# Patient Record
Sex: MALE | Race: WHITE | NOT HISPANIC OR LATINO | Employment: OTHER | URBAN - NONMETROPOLITAN AREA
[De-identification: names, ages, dates, MRNs, and addresses within clinical notes are randomized per-mention and may not be internally consistent; named-entity substitution may affect disease eponyms.]

---

## 2018-01-11 ENCOUNTER — TRANSCRIBE ORDERS (OUTPATIENT)
Dept: ADMINISTRATIVE | Facility: HOSPITAL | Age: 83
End: 2018-01-11

## 2018-01-11 ENCOUNTER — HOSPITAL ENCOUNTER (OUTPATIENT)
Dept: GENERAL RADIOLOGY | Facility: HOSPITAL | Age: 83
Discharge: HOME | End: 2018-01-11
Attending: INTERNAL MEDICINE | Admitting: INTERNAL MEDICINE

## 2018-01-11 DIAGNOSIS — B96.89 ACUTE BRONCHITIS, BACTERIAL: Primary | ICD-10-CM

## 2018-01-11 DIAGNOSIS — J20.8 ACUTE BRONCHITIS, BACTERIAL: Primary | ICD-10-CM

## 2018-01-11 PROCEDURE — 71046 X-RAY EXAM CHEST 2 VIEWS: CPT

## 2018-11-21 RX ORDER — ASPIRIN 325 MG
325 TABLET ORAL DAILY
COMMUNITY

## 2018-11-21 RX ORDER — METOPROLOL SUCCINATE 50 MG/1
50 TABLET, EXTENDED RELEASE ORAL 2 TIMES DAILY
COMMUNITY
End: 2021-01-01

## 2018-11-21 RX ORDER — AMLODIPINE BESYLATE 5 MG/1
5 TABLET ORAL DAILY
COMMUNITY
End: 2021-01-01

## 2018-11-21 RX ORDER — FUROSEMIDE 20 MG/1
20 TABLET ORAL DAILY
COMMUNITY
End: 2020-01-01 | Stop reason: SDUPTHER

## 2018-11-21 RX ORDER — CLONIDINE HYDROCHLORIDE 0.1 MG/1
0.1 TABLET ORAL 3 TIMES DAILY PRN
COMMUNITY
End: 2021-01-01

## 2018-11-21 RX ORDER — MELOXICAM 15 MG/1
15 TABLET ORAL DAILY
COMMUNITY
End: 2019-01-17 | Stop reason: HOSPADM

## 2018-11-21 RX ORDER — MECLIZINE HYDROCHLORIDE 25 MG/1
25 TABLET ORAL 2 TIMES DAILY PRN
COMMUNITY

## 2018-11-21 RX ORDER — SILDENAFIL 100 MG/1
50 TABLET, FILM COATED ORAL DAILY PRN
COMMUNITY

## 2018-11-28 ENCOUNTER — OFFICE VISIT (OUTPATIENT)
Dept: GASTROENTEROLOGY | Facility: CLINIC | Age: 83
End: 2018-11-28

## 2018-11-28 VITALS
WEIGHT: 179.6 LBS | OXYGEN SATURATION: 97 % | SYSTOLIC BLOOD PRESSURE: 130 MMHG | HEART RATE: 87 BPM | DIASTOLIC BLOOD PRESSURE: 82 MMHG | BODY MASS INDEX: 24.33 KG/M2 | HEIGHT: 72 IN

## 2018-11-28 DIAGNOSIS — R13.19 ESOPHAGEAL DYSPHAGIA: Primary | ICD-10-CM

## 2018-11-28 DIAGNOSIS — I10 HTN (HYPERTENSION), BENIGN: ICD-10-CM

## 2018-11-28 PROCEDURE — 99204 OFFICE O/P NEW MOD 45 MIN: CPT | Performed by: CLINICAL NURSE SPECIALIST

## 2018-12-13 ENCOUNTER — ANESTHESIA (OUTPATIENT)
Dept: GASTROENTEROLOGY | Facility: HOSPITAL | Age: 83
End: 2018-12-13

## 2018-12-13 ENCOUNTER — ANESTHESIA EVENT (OUTPATIENT)
Dept: GASTROENTEROLOGY | Facility: HOSPITAL | Age: 83
End: 2018-12-13

## 2018-12-13 ENCOUNTER — HOSPITAL ENCOUNTER (OUTPATIENT)
Facility: HOSPITAL | Age: 83
Setting detail: HOSPITAL OUTPATIENT SURGERY
Discharge: HOME | End: 2018-12-13
Attending: INTERNAL MEDICINE | Admitting: INTERNAL MEDICINE

## 2018-12-13 VITALS
OXYGEN SATURATION: 97 % | TEMPERATURE: 98.1 F | BODY MASS INDEX: 25.76 KG/M2 | WEIGHT: 184 LBS | RESPIRATION RATE: 25 BRPM | HEIGHT: 71 IN | DIASTOLIC BLOOD PRESSURE: 91 MMHG | SYSTOLIC BLOOD PRESSURE: 152 MMHG | HEART RATE: 73 BPM

## 2018-12-13 DIAGNOSIS — R13.19 ESOPHAGEAL DYSPHAGIA: ICD-10-CM

## 2018-12-13 PROCEDURE — 43239 EGD BIOPSY SINGLE/MULTIPLE: CPT | Performed by: INTERNAL MEDICINE

## 2018-12-13 PROCEDURE — 88305 TISSUE EXAM BY PATHOLOGIST: CPT | Performed by: INTERNAL MEDICINE

## 2018-12-13 PROCEDURE — 25010000002 PROPOFOL 10 MG/ML EMULSION: Performed by: NURSE ANESTHETIST, CERTIFIED REGISTERED

## 2018-12-13 PROCEDURE — 87081 CULTURE SCREEN ONLY: CPT | Performed by: INTERNAL MEDICINE

## 2018-12-13 RX ORDER — PROPOFOL 10 MG/ML
VIAL (ML) INTRAVENOUS AS NEEDED
Status: DISCONTINUED | OUTPATIENT
Start: 2018-12-13 | End: 2018-12-13 | Stop reason: SURG

## 2018-12-13 RX ORDER — SODIUM CHLORIDE 9 MG/ML
500 INJECTION, SOLUTION INTRAVENOUS CONTINUOUS PRN
Status: DISCONTINUED | OUTPATIENT
Start: 2018-12-13 | End: 2018-12-13 | Stop reason: HOSPADM

## 2018-12-13 RX ORDER — LIDOCAINE HYDROCHLORIDE 20 MG/ML
INJECTION, SOLUTION INFILTRATION; PERINEURAL AS NEEDED
Status: DISCONTINUED | OUTPATIENT
Start: 2018-12-13 | End: 2018-12-13 | Stop reason: SURG

## 2018-12-13 RX ORDER — PANTOPRAZOLE SODIUM 40 MG/1
40 TABLET, DELAYED RELEASE ORAL 2 TIMES DAILY
Qty: 180 TABLET | Refills: 3 | Status: SHIPPED | OUTPATIENT
Start: 2018-12-13

## 2018-12-13 RX ORDER — SODIUM CHLORIDE 0.9 % (FLUSH) 0.9 %
3 SYRINGE (ML) INJECTION AS NEEDED
Status: DISCONTINUED | OUTPATIENT
Start: 2018-12-13 | End: 2018-12-13 | Stop reason: HOSPADM

## 2018-12-13 RX ADMIN — LIDOCAINE HYDROCHLORIDE 0.5 ML: 10 INJECTION, SOLUTION EPIDURAL; INFILTRATION; INTRACAUDAL; PERINEURAL at 11:59

## 2018-12-13 RX ADMIN — SODIUM CHLORIDE 500 ML: 9 INJECTION, SOLUTION INTRAVENOUS at 11:59

## 2018-12-13 RX ADMIN — LIDOCAINE HYDROCHLORIDE 100 MG: 20 INJECTION, SOLUTION INFILTRATION; PERINEURAL at 13:51

## 2018-12-13 RX ADMIN — PROPOFOL 100 MG: 10 INJECTION, EMULSION INTRAVENOUS at 13:49

## 2018-12-13 RX ADMIN — LIDOCAINE HYDROCHLORIDE 100 MG: 20 INJECTION, SOLUTION INFILTRATION; PERINEURAL at 13:49

## 2018-12-13 NOTE — ANESTHESIA POSTPROCEDURE EVALUATION
Patient: Broderick Gomez    Procedure Summary     Date:  12/13/18 Room / Location:  [facility] ENDOSCOPY 4 / [facility] ENDOSCOPY    Anesthesia Start:  1345 Anesthesia Stop:  1358    Procedure:  ESOPHAGOGASTRODUODENOSCOPY WITH ANESTHESIA (N/A ) Diagnosis:       Esophageal dysphagia      (Esophageal dysphagia [R13.10])    Surgeon:  Fernando Del Castillo MD Provider:  Perez Leiva CRNA    Anesthesia Type:  general ASA Status:  3          Anesthesia Type: general  Last vitals  BP   131/100 (12/13/18 1145)   Temp   98.1 °F (36.7 °C) (12/13/18 1145)   Pulse   88 (12/13/18 1145)   Resp   20 (12/13/18 1145)     SpO2   94 % (12/13/18 1145)     Post Anesthesia Care and Evaluation    Patient location during evaluation: PACU  Patient participation: complete - patient participated  Level of consciousness: awake and alert  Pain management: adequate  Airway patency: patent  Anesthetic complications: No anesthetic complications    Cardiovascular status: acceptable  Respiratory status: acceptable  Hydration status: acceptable

## 2018-12-13 NOTE — ANESTHESIA PREPROCEDURE EVALUATION
Anesthesia Evaluation     Patient summary reviewed   no history of anesthetic complications:  NPO Solid Status: > 8 hours  NPO Liquid Status: > 8 hours           Airway   Mallampati: II  Dental    (+) lower dentures and upper dentures    Pulmonary - negative pulmonary ROS   (-) asthma, sleep apnea  Cardiovascular   Exercise tolerance: (Unsteady gait limited function, denies chest pain)    Patient on routine beta blocker and Beta blocker given within 24 hours of surgery    (+) pacemaker ([employer]) pacemaker, hypertension, dysrhythmias (sick sinus syndrome), hyperlipidemia,       Neuro/Psych  (+) TIA,     (-) seizures  GI/Hepatic/Renal/Endo    (-) liver disease, no renal disease, diabetes    Musculoskeletal     Abdominal    Substance History      OB/GYN          Other                        Anesthesia Plan    ASA 3     general   total IV anesthesia  intravenous induction   Anesthetic plan, all risks, benefits, and alternatives have been provided, discussed and informed consent has been obtained with: patient.

## 2018-12-14 LAB — UREASE TISS QL: NEGATIVE

## 2018-12-22 LAB
CYTO UR: NORMAL
LAB AP CASE REPORT: NORMAL
LAB AP CLINICAL INFORMATION: NORMAL
PATH REPORT.FINAL DX SPEC: NORMAL
PATH REPORT.GROSS SPEC: NORMAL

## 2019-01-17 ENCOUNTER — HOSPITAL ENCOUNTER (EMERGENCY)
Facility: HOSPITAL | Age: 84
Discharge: HOME | End: 2019-01-17
Attending: EMERGENCY MEDICINE | Admitting: EMERGENCY MEDICINE

## 2019-01-17 VITALS
OXYGEN SATURATION: 93 % | HEIGHT: 72 IN | RESPIRATION RATE: 18 BRPM | TEMPERATURE: 98.6 F | WEIGHT: 181 LBS | DIASTOLIC BLOOD PRESSURE: 70 MMHG | SYSTOLIC BLOOD PRESSURE: 110 MMHG | BODY MASS INDEX: 24.52 KG/M2 | HEART RATE: 75 BPM

## 2019-01-17 DIAGNOSIS — S01.01XA LACERATION OF SCALP, INITIAL ENCOUNTER: Primary | ICD-10-CM

## 2019-01-17 PROCEDURE — 99283 EMERGENCY DEPT VISIT LOW MDM: CPT

## 2019-01-17 RX ORDER — NIFEDIPINE 10 MG/1
10 CAPSULE ORAL ONCE
Status: COMPLETED | OUTPATIENT
Start: 2019-01-17 | End: 2019-01-17

## 2019-01-17 RX ORDER — LIDOCAINE HYDROCHLORIDE 10 MG/ML
10 INJECTION, SOLUTION EPIDURAL; INFILTRATION; INTRACAUDAL; PERINEURAL ONCE
Status: COMPLETED | OUTPATIENT
Start: 2019-01-17 | End: 2019-01-17

## 2019-01-17 RX ADMIN — LIDOCAINE HYDROCHLORIDE 10 ML: 10 INJECTION, SOLUTION EPIDURAL; INFILTRATION; INTRACAUDAL; PERINEURAL at 22:24

## 2019-01-17 RX ADMIN — NIFEDIPINE 10 MG: 10 CAPSULE ORAL at 22:27

## 2019-01-18 NOTE — DISCHARGE INSTRUCTIONS
Head Injury, Adult  There are many types of head injuries. They can be as minor as a bump. Some head injuries can be worse. Worse injuries include:  · A strong hit to the head that hurts the brain (concussion).  · A bruise of the brain (contusion). This means there is bleeding in the brain that can cause swelling.  · A cracked skull (skull fracture).  · Bleeding in the brain that gathers, gets thick (makes a clot), and forms a bump (hematoma).    Most problems from a head injury come in the first 24 hours. However, you may still have side effects up to 7-10 days after your injury. It is important to watch your condition for any changes.  Follow these instructions at home:  Activity  · Rest as much as possible.  · Avoid activities that are hard or tiring.  · Make sure you get enough sleep.  · Limit activities that need a lot of thought or attention, such as:  ? Watching TV.  ? Playing memory games and puzzles.  ? Job-related work or homework.  ? Working on the computer, social media, and texting.  · Avoid activities that could cause another head injury until your doctor says it is okay. This includes playing sports.  · Ask your doctor when it is safe for you to go back to your normal activities, such as work or school. Ask your doctor for a step-by-step plan for slowly going back to your normal activities.  · Ask your doctor when you can drive, ride a bicycle, or use heavy machinery. Never do these activities if you are dizzy.  Lifestyle  · Do not drink alcohol until your doctor says it is okay.  · Avoid drug use.  · If it is harder than usual to remember things, write them down.  · If you are easily distracted, try to do one thing at a time.  · Talk with family members or close friends when making important decisions.  · Tell your friends, family, a trusted coworker, and  about your injury, symptoms, and limits (restrictions). Have them watch for any problems that are new or getting worse.  General  instructions  · Take over-the-counter and prescription medicines only as told by your doctor.  · Have someone stay with you for 24 hours after your head injury. This person should watch you for any changes in your symptoms and be ready to get help.  · Keep all follow-up visits as told by your doctor. This is important.  How is this prevented?  · Work on your balance and strength. This can help you avoid falls.  · Wear a seatbelt when you are in a moving vehicle.  · Wear a helmet when:  ? Riding a bicycle.  ? Skiing.  ? Doing any other sport or activity that has a risk of injury.  · Drink alcohol only in moderation.  · Make your home safer by:  ? Getting rid of clutter from the floors and stairs, like things that can make you trip.  ? Using grab bars in bathrooms and handrails by stairs.  ? Placing non-slip mats on floors and in bathtubs.  ? Putting more light in dim areas.  Get help right away if:  · You have:  ? A very bad (severe) headache that is not helped by medicine.  ? Trouble walking or weakness in your arms and legs.  ? Clear or bloody fluid coming from your nose or ears.  ? Changes in your seeing (vision).  ? Jerky movements that you cannot control (seizure).  · You throw up (vomit).  · Your symptoms get worse.  · You lose balance.  · Your speech is slurred.  · You pass out.  · You are sleepier and have trouble staying awake.  · The black centers of your eyes (pupils) change in size.  These symptoms may be an emergency. Do not wait to see if the symptoms will go away. Get medical help right away. Call your local emergency services (911 in the [place] Do not drive yourself to the hospital.  This information is not intended to replace advice given to you by your health care provider. Make sure you discuss any questions you have with your health care provider.  Document Released: 11/30/2009 Document Revised: 04/13/2018 Document Reviewed: 06/27/2017  [employer] Interactive Patient Education © 2018 [employer]      Laceration Care, Adult  A laceration is a cut that goes through all of the layers of the skin and into the tissue that is right under the skin. Some lacerations heal on their own. Others need to be closed with stitches (sutures), staples, skin adhesive strips, or skin glue. Proper laceration care minimizes the risk of infection and helps the laceration to heal better.  How is this treated?  If sutures or staples were used:  · Keep the wound clean and dry.  · If you were given a bandage (dressing), you should change it at least one time per day or as told by your health care provider. You should also change it if it becomes wet or dirty.  · Keep the wound completely dry for the first 24 hours or as told by your health care provider. After that time, you may shower or bathe. However, make sure that the wound is not soaked in water until after the sutures or staples have been removed.  · Clean the wound one time each day or as told by your health care provider:  ? Wash the wound with soap and water.  ? Rinse the wound with water to remove all soap.  ? Pat the wound dry with a clean towel. Do not rub the wound.  · After cleaning the wound, apply a thin layer of antibiotic ointment as told by your health care provider. This will help to prevent infection and keep the dressing from sticking to the wound.  · Have the sutures or staples removed as told by your health care provider.  If skin adhesive strips were used:  · Keep the wound clean and dry.  · If you were given a bandage (dressing), you should change it at least one time per day or as told by your health care provider. You should also change it if it becomes dirty or wet.  · Do not get the skin adhesive strips wet. You may shower or bathe, but be careful to keep the wound dry.  · If the wound gets wet, pat it dry with a clean towel. Do not rub the wound.  · Skin adhesive strips fall off on their own. You may trim the strips as the wound heals. Do not  remove skin adhesive strips that are still stuck to the wound. They will fall off in time.  If skin glue was used:  · Try to keep the wound dry, but you may briefly wet it in the shower or bath. Do not soak the wound in water, such as by swimming.  · After you have showered or bathed, gently pat the wound dry with a clean towel. Do not rub the wound.  · Do not do any activities that will make you sweat heavily until the skin glue has fallen off on its own.  · Do not apply liquid, cream, or ointment medicine to the wound while the skin glue is in place. Using those may loosen the film before the wound has healed.  · If you were given a bandage (dressing), you should change it at least one time per day or as told by your health care provider. You should also change it if it becomes dirty or wet.  · If a dressing is placed over the wound, be careful not to apply tape directly over the skin glue. Doing that may cause the glue to be pulled off before the wound has healed.  · Do not pick at the glue. The skin glue usually remains in place for 5-10 days, then it falls off of the skin.  General Instructions  · Take over-the-counter and prescription medicines only as told by your health care provider.  · If you were prescribed an antibiotic medicine or ointment, take or apply it as told by your doctor. Do not stop using it even if your condition improves.  · To help prevent scarring, make sure to cover your wound with sunscreen whenever you are outside after stitches are removed, after adhesive strips are removed, or when glue remains in place and the wound is healed. Make sure to wear a sunscreen of at least 30 SPF.  · Do not scratch or pick at the wound.  · Keep all follow-up visits as told by your health care provider. This is important.  · Check your wound every day for signs of infection. Watch for:  ? Redness, swelling, or pain.  ? Fluid, blood, or pus.  · Raise (elevate) the injured area above the level of your heart  while you are sitting or lying down, if possible.  Contact a health care provider if:  · You received a tetanus shot and you have swelling, severe pain, redness, or bleeding at the injection site.  · You have a fever.  · A wound that was closed breaks open.  · You notice a bad smell coming from your wound or your dressing.  · You notice something coming out of the wound, such as wood or glass.  · Your pain is not controlled with medicine.  · You have increased redness, swelling, or pain at the site of your wound.  · You have fluid, blood, or pus coming from your wound.  · You notice a change in the color of your skin near your wound.  · You need to change the dressing frequently due to fluid, blood, or pus draining from the wound.  · You develop a new rash.  · You develop numbness around the wound.  Get help right away if:  · You develop severe swelling around the wound.  · Your pain suddenly increases and is severe.  · You develop painful lumps near the wound or on skin that is anywhere on your body.  · You have a red streak going away from your wound.  · The wound is on your hand or foot and you cannot properly move a finger or toe.  · The wound is on your hand or foot and you notice that your fingers or toes look pale or bluish.  This information is not intended to replace advice given to you by your health care provider. Make sure you discuss any questions you have with your health care provider.  Document Released: 12/18/2006 Document Revised: 05/19/2017 Document Reviewed: 12/14/2015  [employer] Interactive Patient Education © 2018 [employer]

## 2019-01-18 NOTE — ED PROVIDER NOTES
Subjective     Fall   Mechanism of injury: fall    Injury location:  Head/neck  Head/neck injury location:  Scalp  Incident location:  Home  Arrived directly from scene: yes    Fall:     Fall occurred:  Standing    Impact surface:  Furniture    Point of impact:  Head    Entrapped after fall: no    Suspicion of alcohol use: no    Suspicion of drug use: no    Tetanus status:  Unknown  Prior to arrival data:     Loss of consciousness: no      Amnesic to event: no      Airway condition since incident:  Stable    Breathing condition since incident:  Stable    Circulation condition since incident:  Stable    Mental status condition since incident:  Stable    Disability condition since incident:  Stable  Associated symptoms: no abdominal pain, no back pain, no blindness, no chest pain, no headaches, no hearing loss, no loss of consciousness, no nausea, no neck pain, no seizures and no vomiting    Risk factors: no anticoagulation therapy, no dialysis and no hemophilia    Head Laceration   Associated symptoms: no abdominal pain, no chest pain, no congestion, no cough, no fatigue, no fever, no headaches, no loss of consciousness, no nausea and no vomiting        Review of Systems   Constitutional: Negative.  Negative for chills, fatigue and fever.   HENT: Negative.  Negative for congestion and hearing loss.    Eyes: Negative for blindness.   Respiratory: Negative.  Negative for cough, chest tightness and stridor.    Cardiovascular: Negative.  Negative for chest pain.   Gastrointestinal: Negative.  Negative for abdominal distention, abdominal pain, nausea and vomiting.   Endocrine: Negative.    Genitourinary: Negative.  Negative for difficulty urinating and flank pain.   Musculoskeletal: Negative.  Negative for back pain and neck pain.   Skin: Negative.  Negative for color change.   Neurological: Negative.  Negative for dizziness, seizures, loss of consciousness and headaches.   All other systems reviewed and are  negative.      Past Medical History:   Diagnosis Date   • Acute right lumbar radiculopathy    • Arthritis    • Ataxia    • Atrial fibrillation (CMS/HCC)    • Bilateral edema of lower extremity    • BPPV (benign paroxysmal positional vertigo), bilateral    • Carotid occlusion, bilateral    • Cerebral microvascular disease    • CVA (cerebral vascular accident) (CMS/HCC)    • Elevated PSA    • GERD (gastroesophageal reflux disease)    • Hypercholesteremia    • Hypertension    • Inguinal hernia    • Rhinophyma    • Sinoatrial node dysfunction (CMS/HCC)    • Skin cancer    • Thrombosis of thoracic aorta (CMS/HCC)    • Total bilirubin, elevated    • TSH elevation        No Known Allergies    Past Surgical History:   Procedure Laterality Date   • CATARACT EXTRACTION, BILATERAL     • COLONOSCOPY  05/16/2012    adenomatous polyp @ cecum, hepatic flexure, 40cm, and 35cm, multiple diverticula in sigmoid and descending colon, recall 3 years, Dr. Del Castillo   • ENDOSCOPY N/A 12/13/2018    Procedure: ESOPHAGOGASTRODUODENOSCOPY WITH ANESTHESIA;  Surgeon: Fernando Del Castillo MD;  Location: [facility] ENDOSCOPY;  Service: Gastroenterology   • EYE SURGERY      eyelid replacement   • HEMORRHOIDECTOMY     • INGUINAL HERNIA REPAIR Right 2008   • KNEE SURGERY Right    • ROTATOR CUFF REPAIR Right 2000   • SKIN CANCER EXCISION         Family History   Problem Relation Age of Onset   • Colon cancer Neg Hx    • Colon polyps Neg Hx        Social History     Socioeconomic History   • Marital status:      Spouse name: Not on file   • Number of children: Not on file   • Years of education: Not on file   • Highest education level: Not on file   Tobacco Use   • Smoking status: Never Smoker   • Smokeless tobacco: Current User     Types: Snuff   Substance and Sexual Activity   • Alcohol use: Yes     Comment: occasional   • Drug use: No   • Sexual activity: Defer           Objective   Physical Exam   Constitutional: He is oriented to person, place, and  time. He appears well-developed and well-nourished. He is active. No distress.   HENT:   Head: Normocephalic. Head is without raccoon's eyes, without Encinas's sign, without abrasion, without contusion and without laceration.       Right Ear: Tympanic membrane and external ear normal.   Left Ear: Tympanic membrane and external ear normal.   Nose: Nose normal.   Mouth/Throat: Oropharynx is clear and moist.   Eyes: Conjunctivae, EOM and lids are normal. Pupils are equal, round, and reactive to light.   Neck: Trachea normal and normal range of motion. Neck supple. Normal carotid pulses and no JVD present. No spinous process tenderness and no muscular tenderness present. No neck rigidity. No tracheal deviation and normal range of motion present.   Cardiovascular: Normal rate, regular rhythm, normal heart sounds, intact distal pulses and normal pulses.   Pulmonary/Chest: Effort normal and breath sounds normal. No accessory muscle usage or stridor. No respiratory distress. He exhibits no mass, no tenderness, no bony tenderness, no laceration, no crepitus, no deformity and no swelling.   Abdominal: Soft. Normal appearance, normal aorta and bowel sounds are normal. He exhibits no distension and no pulsatile midline mass. There is no tenderness.   Musculoskeletal: Normal range of motion. He exhibits no edema, tenderness or deformity.        Cervical back: Normal. He exhibits no tenderness, no bony tenderness, no deformity and no pain.        Thoracic back: Normal. He exhibits no tenderness, no bony tenderness, no pain and no spasm.        Lumbar back: Normal. He exhibits normal range of motion, no tenderness, no bony tenderness and no deformity.   Neurological: He is alert and oriented to person, place, and time. He has normal strength and normal reflexes. He displays normal reflexes. No cranial nerve deficit or sensory deficit. He exhibits normal muscle tone. GCS eye subscore is 4. GCS verbal subscore is 5. GCS motor  subscore is 6.   Skin: Skin is warm, dry and intact. He is not diaphoretic. No pallor.   Psychiatric: He has a normal mood and affect. His speech is normal and behavior is normal.   Nursing note and vitals reviewed.      Laceration Repair  Date/Time: 1/17/2019 10:45 PM  Performed by: Jose Infante MD  Authorized by: Jose Infante MD     Consent:     Consent obtained:  Written    Consent given by:  Patient    Risks discussed:  Infection, pain, retained foreign body, tendon damage, poor cosmetic result, need for additional repair, nerve damage, poor wound healing and vascular damage    Alternatives discussed:  No treatment  Anesthesia (see MAR for exact dosages):     Anesthesia method:  Local infiltration    Local anesthetic:  Lidocaine 1% w/o epi  Laceration details:     Location:  Scalp    Scalp location:  L temporal  Repair type:     Repair type:  Simple  Pre-procedure details:     Preparation:  Patient was prepped and draped in usual sterile fashion  Exploration:     Hemostasis achieved with:  Direct pressure    Wound extent: no foreign bodies/material noted and no underlying fracture noted      Contaminated: no    Treatment:     Area cleansed with:  Betadine    Amount of cleaning:  Standard  Skin repair:     Repair method:  Staples  Approximation:     Vermilion border: well-aligned    Post-procedure details:     Dressing:  Adhesive bandage    Patient tolerance of procedure:  Tolerated well, no immediate complications               ED Course  ED Course as of Jan 17 2252   Thu Jan 17, 2019 2246 Pt and family do not want a ct of the head or neck the possibility of occult ich and complication explained   [TS]   2249 Will take his bp meds at home   [TS]      ED Course User Index  [TS] Jose Infante MD                  [facility]      Final diagnoses:   Laceration of scalp, initial encounter            Jose Infante MD  01/17/19 9302

## 2019-11-14 ENCOUNTER — HOSPITAL ENCOUNTER (OUTPATIENT)
Dept: ULTRASOUND IMAGING | Facility: HOSPITAL | Age: 84
Discharge: HOME | End: 2019-11-14
Admitting: INTERNAL MEDICINE

## 2019-11-14 ENCOUNTER — HOSPITAL ENCOUNTER (OUTPATIENT)
Dept: GENERAL RADIOLOGY | Facility: HOSPITAL | Age: 84
Discharge: HOME | End: 2019-11-14

## 2019-11-14 ENCOUNTER — TRANSCRIBE ORDERS (OUTPATIENT)
Dept: ADMINISTRATIVE | Facility: HOSPITAL | Age: 84
End: 2019-11-14

## 2019-11-14 ENCOUNTER — HOSPITAL ENCOUNTER (OUTPATIENT)
Dept: CT IMAGING | Facility: HOSPITAL | Age: 84
Discharge: HOME | End: 2019-11-14

## 2019-11-14 DIAGNOSIS — M17.12 ARTHRITIS OF LEFT KNEE: ICD-10-CM

## 2019-11-14 DIAGNOSIS — I63.9 IMPENDING CEREBROVASCULAR ACCIDENT (HCC): ICD-10-CM

## 2019-11-14 DIAGNOSIS — I65.23 BILATERAL CAROTID ARTERY OCCLUSION: ICD-10-CM

## 2019-11-14 DIAGNOSIS — I65.23 BILATERAL CAROTID ARTERY OCCLUSION: Primary | ICD-10-CM

## 2019-11-14 DIAGNOSIS — R06.2 WHEEZING: ICD-10-CM

## 2019-11-14 PROCEDURE — 73562 X-RAY EXAM OF KNEE 3: CPT

## 2019-11-14 PROCEDURE — 70450 CT HEAD/BRAIN W/O DYE: CPT

## 2019-11-14 PROCEDURE — 71046 X-RAY EXAM CHEST 2 VIEWS: CPT

## 2019-11-14 PROCEDURE — 93880 EXTRACRANIAL BILAT STUDY: CPT

## 2019-12-30 ENCOUNTER — HOSPITAL ENCOUNTER (INPATIENT)
Facility: HOSPITAL | Age: 84
LOS: 2 days | Discharge: HOME | End: 2020-01-01
Attending: EMERGENCY MEDICINE | Admitting: INTERNAL MEDICINE

## 2019-12-30 ENCOUNTER — APPOINTMENT (OUTPATIENT)
Dept: CT IMAGING | Facility: HOSPITAL | Age: 84
End: 2019-12-30

## 2019-12-30 ENCOUNTER — APPOINTMENT (OUTPATIENT)
Dept: GENERAL RADIOLOGY | Facility: HOSPITAL | Age: 84
End: 2019-12-30

## 2019-12-30 DIAGNOSIS — I70.90 ATHEROSCLEROSIS: ICD-10-CM

## 2019-12-30 DIAGNOSIS — K92.2 LOWER GI BLEEDING: ICD-10-CM

## 2019-12-30 DIAGNOSIS — K52.9 COLITIS: Primary | ICD-10-CM

## 2019-12-30 PROBLEM — I48.91 ATRIAL FIBRILLATION (HCC): Chronic | Status: ACTIVE | Noted: 2019-12-30

## 2019-12-30 PROBLEM — I49.5 SINOATRIAL NODE DYSFUNCTION (HCC): Chronic | Status: ACTIVE | Noted: 2019-12-30

## 2019-12-30 PROBLEM — J90 PLEURAL EFFUSION: Chronic | Status: ACTIVE | Noted: 2019-12-30

## 2019-12-30 LAB
ABO GROUP BLD: NORMAL
ALBUMIN SERPL-MCNC: 3.9 G/DL (ref 3.5–5.2)
ALBUMIN/GLOB SERPL: 1.3 G/DL
ALP SERPL-CCNC: 88 U/L (ref 39–117)
ALT SERPL W P-5'-P-CCNC: 10 U/L (ref 1–41)
ANION GAP SERPL CALCULATED.3IONS-SCNC: 14 MMOL/L (ref 5–15)
APTT PPP: 33 SECONDS (ref 24.1–35)
AST SERPL-CCNC: 13 U/L (ref 1–40)
BACTERIA UR QL AUTO: ABNORMAL /HPF
BASOPHILS # BLD AUTO: 0.09 10*3/MM3 (ref 0–0.2)
BASOPHILS NFR BLD AUTO: 0.6 % (ref 0–1.5)
BILIRUB SERPL-MCNC: 1.3 MG/DL (ref 0.2–1.2)
BILIRUB UR QL STRIP: ABNORMAL
BLD GP AB SCN SERPL QL: NEGATIVE
BUN BLD-MCNC: 17 MG/DL (ref 8–23)
BUN/CREAT SERPL: 18.9 (ref 7–25)
CALCIUM SPEC-SCNC: 9.1 MG/DL (ref 8.2–9.6)
CHLORIDE SERPL-SCNC: 102 MMOL/L (ref 98–107)
CLARITY UR: CLEAR
CO2 SERPL-SCNC: 25 MMOL/L (ref 22–29)
COLOR UR: ABNORMAL
CREAT BLD-MCNC: 0.9 MG/DL (ref 0.76–1.27)
DEPRECATED RDW RBC AUTO: 49.5 FL (ref 37–54)
DEVELOPER EXPIRATION DATE: ABNORMAL
DEVELOPER LOT NUMBER: 165
EOSINOPHIL # BLD AUTO: 0.22 10*3/MM3 (ref 0–0.4)
EOSINOPHIL NFR BLD AUTO: 1.4 % (ref 0.3–6.2)
ERYTHROCYTE [DISTWIDTH] IN BLOOD BY AUTOMATED COUNT: 15.1 % (ref 12.3–15.4)
EXPIRATION DATE: ABNORMAL
FECAL OCCULT BLOOD SCREEN, POC: POSITIVE
GFR SERPL CREATININE-BSD FRML MDRD: 79 ML/MIN/1.73
GLOBULIN UR ELPH-MCNC: 3 GM/DL
GLUCOSE BLD-MCNC: 152 MG/DL (ref 65–99)
GLUCOSE UR STRIP-MCNC: NEGATIVE MG/DL
HCT VFR BLD AUTO: 50.9 % (ref 37.5–51)
HGB BLD-MCNC: 17.5 G/DL (ref 13–17.7)
HGB UR QL STRIP.AUTO: ABNORMAL
HOLD SPECIMEN: NORMAL
HOLD SPECIMEN: NORMAL
HYALINE CASTS UR QL AUTO: ABNORMAL /LPF
IMM GRANULOCYTES # BLD AUTO: 0.07 10*3/MM3 (ref 0–0.05)
IMM GRANULOCYTES NFR BLD AUTO: 0.4 % (ref 0–0.5)
INR PPP: 1.19 (ref 0.91–1.09)
KETONES UR QL STRIP: NEGATIVE
LEUKOCYTE ESTERASE UR QL STRIP.AUTO: ABNORMAL
LYMPHOCYTES # BLD AUTO: 1.07 10*3/MM3 (ref 0.7–3.1)
LYMPHOCYTES NFR BLD AUTO: 6.6 % (ref 19.6–45.3)
Lab: 165
MCH RBC QN AUTO: 31.1 PG (ref 26.6–33)
MCHC RBC AUTO-ENTMCNC: 34.4 G/DL (ref 31.5–35.7)
MCV RBC AUTO: 90.6 FL (ref 79–97)
MONOCYTES # BLD AUTO: 1.08 10*3/MM3 (ref 0.1–0.9)
MONOCYTES NFR BLD AUTO: 6.7 % (ref 5–12)
NEGATIVE CONTROL: NEGATIVE
NEUTROPHILS # BLD AUTO: 13.62 10*3/MM3 (ref 1.7–7)
NEUTROPHILS NFR BLD AUTO: 84.3 % (ref 42.7–76)
NITRITE UR QL STRIP: POSITIVE
NRBC BLD AUTO-RTO: 0 /100 WBC (ref 0–0.2)
PH UR STRIP.AUTO: <=5 [PH] (ref 5–8)
PLATELET # BLD AUTO: 152 10*3/MM3 (ref 140–450)
PMV BLD AUTO: 9.5 FL (ref 6–12)
POSITIVE CONTROL: POSITIVE
POTASSIUM BLD-SCNC: 3.6 MMOL/L (ref 3.5–5.2)
PROT SERPL-MCNC: 6.9 G/DL (ref 6–8.5)
PROT UR QL STRIP: ABNORMAL
PROTHROMBIN TIME: 15.5 SECONDS (ref 11.9–14.6)
RBC # BLD AUTO: 5.62 10*6/MM3 (ref 4.14–5.8)
RBC # UR: ABNORMAL /HPF
REF LAB TEST METHOD: ABNORMAL
RH BLD: NEGATIVE
SODIUM BLD-SCNC: 141 MMOL/L (ref 136–145)
SP GR UR STRIP: 1.02 (ref 1–1.03)
SQUAMOUS #/AREA URNS HPF: ABNORMAL /HPF
T&S EXPIRATION DATE: NORMAL
UROBILINOGEN UR QL STRIP: ABNORMAL
WBC NRBC COR # BLD: 16.15 10*3/MM3 (ref 3.4–10.8)
WBC UR QL AUTO: ABNORMAL /HPF
WHOLE BLOOD HOLD SPECIMEN: NORMAL
WHOLE BLOOD HOLD SPECIMEN: NORMAL

## 2019-12-30 PROCEDURE — 74177 CT ABD & PELVIS W/CONTRAST: CPT

## 2019-12-30 PROCEDURE — 85025 COMPLETE CBC W/AUTO DIFF WBC: CPT | Performed by: EMERGENCY MEDICINE

## 2019-12-30 PROCEDURE — 25010000002 ERTAPENEM PER 500 MG: Performed by: EMERGENCY MEDICINE

## 2019-12-30 PROCEDURE — 86850 RBC ANTIBODY SCREEN: CPT | Performed by: EMERGENCY MEDICINE

## 2019-12-30 PROCEDURE — 84439 ASSAY OF FREE THYROXINE: CPT | Performed by: INTERNAL MEDICINE

## 2019-12-30 PROCEDURE — 86900 BLOOD TYPING SEROLOGIC ABO: CPT | Performed by: EMERGENCY MEDICINE

## 2019-12-30 PROCEDURE — 83735 ASSAY OF MAGNESIUM: CPT | Performed by: INTERNAL MEDICINE

## 2019-12-30 PROCEDURE — 84443 ASSAY THYROID STIM HORMONE: CPT | Performed by: INTERNAL MEDICINE

## 2019-12-30 PROCEDURE — 82270 OCCULT BLOOD FECES: CPT | Performed by: EMERGENCY MEDICINE

## 2019-12-30 PROCEDURE — 25010000002 IOPAMIDOL 61 % SOLUTION: Performed by: EMERGENCY MEDICINE

## 2019-12-30 PROCEDURE — 84100 ASSAY OF PHOSPHORUS: CPT | Performed by: INTERNAL MEDICINE

## 2019-12-30 PROCEDURE — 80053 COMPREHEN METABOLIC PANEL: CPT | Performed by: EMERGENCY MEDICINE

## 2019-12-30 PROCEDURE — 71045 X-RAY EXAM CHEST 1 VIEW: CPT

## 2019-12-30 PROCEDURE — 93005 ELECTROCARDIOGRAM TRACING: CPT | Performed by: EMERGENCY MEDICINE

## 2019-12-30 PROCEDURE — 86901 BLOOD TYPING SEROLOGIC RH(D): CPT | Performed by: EMERGENCY MEDICINE

## 2019-12-30 PROCEDURE — 93010 ELECTROCARDIOGRAM REPORT: CPT | Performed by: INTERNAL MEDICINE

## 2019-12-30 PROCEDURE — 99284 EMERGENCY DEPT VISIT MOD MDM: CPT

## 2019-12-30 PROCEDURE — 81001 URINALYSIS AUTO W/SCOPE: CPT | Performed by: EMERGENCY MEDICINE

## 2019-12-30 PROCEDURE — 85610 PROTHROMBIN TIME: CPT | Performed by: EMERGENCY MEDICINE

## 2019-12-30 PROCEDURE — 85730 THROMBOPLASTIN TIME PARTIAL: CPT | Performed by: EMERGENCY MEDICINE

## 2019-12-30 RX ORDER — SODIUM CHLORIDE 0.9 % (FLUSH) 0.9 %
10 SYRINGE (ML) INJECTION AS NEEDED
Status: DISCONTINUED | OUTPATIENT
Start: 2019-12-30 | End: 2020-01-01 | Stop reason: HOSPADM

## 2019-12-30 RX ORDER — LEVOTHYROXINE SODIUM 0.07 MG/1
75 TABLET ORAL DAILY
COMMUNITY
End: 2020-01-01

## 2019-12-30 RX ORDER — IPRATROPIUM BROMIDE AND ALBUTEROL SULFATE 2.5; .5 MG/3ML; MG/3ML
3 SOLUTION RESPIRATORY (INHALATION)
COMMUNITY
End: 2020-01-01

## 2019-12-30 RX ADMIN — IOPAMIDOL 100 ML: 612 INJECTION, SOLUTION INTRAVENOUS at 18:42

## 2019-12-30 RX ADMIN — SODIUM CHLORIDE 1 G: 900 INJECTION INTRAVENOUS at 21:49

## 2019-12-31 PROBLEM — I50.32 CHRONIC DIASTOLIC HEART FAILURE (HCC): Status: ACTIVE | Noted: 2019-12-31

## 2019-12-31 LAB
ALBUMIN SERPL-MCNC: 3.2 G/DL (ref 3.5–5.2)
ALBUMIN/GLOB SERPL: 1.2 G/DL
ALP SERPL-CCNC: 81 U/L (ref 39–117)
ALT SERPL W P-5'-P-CCNC: <5 U/L (ref 1–41)
ANION GAP SERPL CALCULATED.3IONS-SCNC: 10 MMOL/L (ref 5–15)
AST SERPL-CCNC: 17 U/L (ref 1–40)
BASOPHILS # BLD AUTO: 0.08 10*3/MM3 (ref 0–0.2)
BASOPHILS NFR BLD AUTO: 0.5 % (ref 0–1.5)
BILIRUB SERPL-MCNC: 1.5 MG/DL (ref 0.2–1.2)
BUN BLD-MCNC: 16 MG/DL (ref 8–23)
BUN/CREAT SERPL: 19.5 (ref 7–25)
CALCIUM SPEC-SCNC: 8.8 MG/DL (ref 8.2–9.6)
CHLORIDE SERPL-SCNC: 105 MMOL/L (ref 98–107)
CO2 SERPL-SCNC: 27 MMOL/L (ref 22–29)
CREAT BLD-MCNC: 0.82 MG/DL (ref 0.76–1.27)
D-LACTATE SERPL-SCNC: 1.3 MMOL/L (ref 0.5–2)
DEPRECATED RDW RBC AUTO: 49.5 FL (ref 37–54)
EOSINOPHIL # BLD AUTO: 0.33 10*3/MM3 (ref 0–0.4)
EOSINOPHIL NFR BLD AUTO: 2 % (ref 0.3–6.2)
ERYTHROCYTE [DISTWIDTH] IN BLOOD BY AUTOMATED COUNT: 15 % (ref 12.3–15.4)
GFR SERPL CREATININE-BSD FRML MDRD: 88 ML/MIN/1.73
GLOBULIN UR ELPH-MCNC: 2.7 GM/DL
GLUCOSE BLD-MCNC: 126 MG/DL (ref 65–99)
HCT VFR BLD AUTO: 43.1 % (ref 37.5–51)
HCT VFR BLD AUTO: 45 % (ref 37.5–51)
HCT VFR BLD AUTO: 45.2 % (ref 37.5–51)
HGB BLD-MCNC: 14.9 G/DL (ref 13–17.7)
HGB BLD-MCNC: 15.4 G/DL (ref 13–17.7)
HGB BLD-MCNC: 15.5 G/DL (ref 13–17.7)
IMM GRANULOCYTES # BLD AUTO: 0.07 10*3/MM3 (ref 0–0.05)
IMM GRANULOCYTES NFR BLD AUTO: 0.4 % (ref 0–0.5)
LYMPHOCYTES # BLD AUTO: 1.57 10*3/MM3 (ref 0.7–3.1)
LYMPHOCYTES NFR BLD AUTO: 9.7 % (ref 19.6–45.3)
MAGNESIUM SERPL-MCNC: 2 MG/DL (ref 1.7–2.3)
MCH RBC QN AUTO: 30.7 PG (ref 26.6–33)
MCHC RBC AUTO-ENTMCNC: 34.1 G/DL (ref 31.5–35.7)
MCV RBC AUTO: 90 FL (ref 79–97)
MONOCYTES # BLD AUTO: 1.08 10*3/MM3 (ref 0.1–0.9)
MONOCYTES NFR BLD AUTO: 6.6 % (ref 5–12)
NEUTROPHILS # BLD AUTO: 13.13 10*3/MM3 (ref 1.7–7)
NEUTROPHILS NFR BLD AUTO: 80.8 % (ref 42.7–76)
NRBC BLD AUTO-RTO: 0 /100 WBC (ref 0–0.2)
PHOSPHATE SERPL-MCNC: 2.3 MG/DL (ref 2.5–4.5)
PLATELET # BLD AUTO: 139 10*3/MM3 (ref 140–450)
PMV BLD AUTO: 9.5 FL (ref 6–12)
POTASSIUM BLD-SCNC: 3.9 MMOL/L (ref 3.5–5.2)
PROT SERPL-MCNC: 5.9 G/DL (ref 6–8.5)
RBC # BLD AUTO: 5.02 10*6/MM3 (ref 4.14–5.8)
SODIUM BLD-SCNC: 142 MMOL/L (ref 136–145)
T4 FREE SERPL-MCNC: 1.51 NG/DL (ref 0.93–1.7)
TSH SERPL DL<=0.05 MIU/L-ACNC: 2.99 UIU/ML (ref 0.27–4.2)
WBC NRBC COR # BLD: 16.26 10*3/MM3 (ref 3.4–10.8)

## 2019-12-31 PROCEDURE — 94640 AIRWAY INHALATION TREATMENT: CPT

## 2019-12-31 PROCEDURE — 85014 HEMATOCRIT: CPT | Performed by: INTERNAL MEDICINE

## 2019-12-31 PROCEDURE — 85025 COMPLETE CBC W/AUTO DIFF WBC: CPT | Performed by: INTERNAL MEDICINE

## 2019-12-31 PROCEDURE — 94799 UNLISTED PULMONARY SVC/PX: CPT

## 2019-12-31 PROCEDURE — 80053 COMPREHEN METABOLIC PANEL: CPT | Performed by: INTERNAL MEDICINE

## 2019-12-31 PROCEDURE — 99222 1ST HOSP IP/OBS MODERATE 55: CPT | Performed by: INTERNAL MEDICINE

## 2019-12-31 PROCEDURE — 83605 ASSAY OF LACTIC ACID: CPT | Performed by: INTERNAL MEDICINE

## 2019-12-31 PROCEDURE — 87040 BLOOD CULTURE FOR BACTERIA: CPT | Performed by: INTERNAL MEDICINE

## 2019-12-31 PROCEDURE — 85018 HEMOGLOBIN: CPT | Performed by: INTERNAL MEDICINE

## 2019-12-31 PROCEDURE — 25010000002 ERTAPENEM PER 500 MG: Performed by: INTERNAL MEDICINE

## 2019-12-31 PROCEDURE — 94760 N-INVAS EAR/PLS OXIMETRY 1: CPT

## 2019-12-31 RX ORDER — LEVOTHYROXINE SODIUM 0.07 MG/1
75 TABLET ORAL
Status: DISCONTINUED | OUTPATIENT
Start: 2019-12-31 | End: 2020-01-01 | Stop reason: HOSPADM

## 2019-12-31 RX ORDER — SODIUM CHLORIDE 0.9 % (FLUSH) 0.9 %
10 SYRINGE (ML) INJECTION AS NEEDED
Status: DISCONTINUED | OUTPATIENT
Start: 2019-12-31 | End: 2020-01-01 | Stop reason: HOSPADM

## 2019-12-31 RX ORDER — FUROSEMIDE 20 MG/1
20 TABLET ORAL DAILY
Status: DISCONTINUED | OUTPATIENT
Start: 2019-12-31 | End: 2020-01-01 | Stop reason: HOSPADM

## 2019-12-31 RX ORDER — SODIUM CHLORIDE 9 MG/ML
50 INJECTION, SOLUTION INTRAVENOUS CONTINUOUS
Status: DISCONTINUED | OUTPATIENT
Start: 2019-12-31 | End: 2020-01-01

## 2019-12-31 RX ORDER — MECLIZINE HYDROCHLORIDE 25 MG/1
25 TABLET ORAL 2 TIMES DAILY PRN
Status: DISCONTINUED | OUTPATIENT
Start: 2019-12-31 | End: 2020-01-01 | Stop reason: HOSPADM

## 2019-12-31 RX ORDER — METOPROLOL SUCCINATE 50 MG/1
50 TABLET, EXTENDED RELEASE ORAL 2 TIMES DAILY
Status: DISCONTINUED | OUTPATIENT
Start: 2019-12-31 | End: 2020-01-01 | Stop reason: HOSPADM

## 2019-12-31 RX ORDER — ONDANSETRON 2 MG/ML
4 INJECTION INTRAMUSCULAR; INTRAVENOUS EVERY 6 HOURS PRN
Status: DISCONTINUED | OUTPATIENT
Start: 2019-12-31 | End: 2020-01-01 | Stop reason: HOSPADM

## 2019-12-31 RX ORDER — ACETAMINOPHEN 325 MG/1
650 TABLET ORAL EVERY 6 HOURS PRN
Status: DISCONTINUED | OUTPATIENT
Start: 2019-12-31 | End: 2020-01-01 | Stop reason: HOSPADM

## 2019-12-31 RX ORDER — IPRATROPIUM BROMIDE AND ALBUTEROL SULFATE 2.5; .5 MG/3ML; MG/3ML
3 SOLUTION RESPIRATORY (INHALATION)
Status: DISCONTINUED | OUTPATIENT
Start: 2019-12-31 | End: 2020-01-01 | Stop reason: HOSPADM

## 2019-12-31 RX ORDER — PANTOPRAZOLE SODIUM 40 MG/1
40 TABLET, DELAYED RELEASE ORAL 2 TIMES DAILY
Status: DISCONTINUED | OUTPATIENT
Start: 2019-12-31 | End: 2020-01-01 | Stop reason: HOSPADM

## 2019-12-31 RX ORDER — AMLODIPINE BESYLATE 5 MG/1
5 TABLET ORAL DAILY
Status: DISCONTINUED | OUTPATIENT
Start: 2019-12-31 | End: 2020-01-01 | Stop reason: HOSPADM

## 2019-12-31 RX ORDER — CLONIDINE HYDROCHLORIDE 0.1 MG/1
0.1 TABLET ORAL 3 TIMES DAILY PRN
Status: DISCONTINUED | OUTPATIENT
Start: 2019-12-31 | End: 2020-01-01 | Stop reason: HOSPADM

## 2019-12-31 RX ORDER — SODIUM CHLORIDE 0.9 % (FLUSH) 0.9 %
10 SYRINGE (ML) INJECTION EVERY 12 HOURS SCHEDULED
Status: DISCONTINUED | OUTPATIENT
Start: 2019-12-31 | End: 2020-01-01 | Stop reason: HOSPADM

## 2019-12-31 RX ADMIN — PANTOPRAZOLE SODIUM 40 MG: 40 TABLET, DELAYED RELEASE ORAL at 08:39

## 2019-12-31 RX ADMIN — SODIUM CHLORIDE, PRESERVATIVE FREE 10 ML: 5 INJECTION INTRAVENOUS at 00:47

## 2019-12-31 RX ADMIN — IPRATROPIUM BROMIDE AND ALBUTEROL SULFATE 3 ML: 2.5; .5 SOLUTION RESPIRATORY (INHALATION) at 06:10

## 2019-12-31 RX ADMIN — IPRATROPIUM BROMIDE AND ALBUTEROL SULFATE 3 ML: 2.5; .5 SOLUTION RESPIRATORY (INHALATION) at 20:49

## 2019-12-31 RX ADMIN — SODIUM CHLORIDE 1 G: 900 INJECTION INTRAVENOUS at 21:39

## 2019-12-31 RX ADMIN — METOPROLOL SUCCINATE 50 MG: 50 TABLET, FILM COATED, EXTENDED RELEASE ORAL at 21:39

## 2019-12-31 RX ADMIN — SODIUM CHLORIDE 50 ML/HR: 9 INJECTION, SOLUTION INTRAVENOUS at 00:46

## 2019-12-31 RX ADMIN — IPRATROPIUM BROMIDE AND ALBUTEROL SULFATE 3 ML: 2.5; .5 SOLUTION RESPIRATORY (INHALATION) at 10:12

## 2019-12-31 RX ADMIN — SODIUM CHLORIDE, PRESERVATIVE FREE 10 ML: 5 INJECTION INTRAVENOUS at 21:40

## 2019-12-31 RX ADMIN — AMLODIPINE BESYLATE 5 MG: 5 TABLET ORAL at 08:36

## 2019-12-31 RX ADMIN — FUROSEMIDE 20 MG: 20 TABLET ORAL at 08:36

## 2019-12-31 RX ADMIN — IPRATROPIUM BROMIDE AND ALBUTEROL SULFATE 3 ML: 2.5; .5 SOLUTION RESPIRATORY (INHALATION) at 14:45

## 2019-12-31 RX ADMIN — PANTOPRAZOLE SODIUM 40 MG: 40 TABLET, DELAYED RELEASE ORAL at 21:39

## 2019-12-31 RX ADMIN — LEVOTHYROXINE SODIUM 75 MCG: 75 TABLET ORAL at 06:05

## 2019-12-31 RX ADMIN — METOPROLOL SUCCINATE 50 MG: 50 TABLET, FILM COATED, EXTENDED RELEASE ORAL at 08:36

## 2020-01-01 ENCOUNTER — TELEPHONE (OUTPATIENT)
Dept: INTERNAL MEDICINE | Facility: CLINIC | Age: 85
End: 2020-01-01

## 2020-01-01 ENCOUNTER — RESULTS ENCOUNTER (OUTPATIENT)
Dept: INTERNAL MEDICINE | Facility: CLINIC | Age: 85
End: 2020-01-01

## 2020-01-01 ENCOUNTER — OFFICE VISIT (OUTPATIENT)
Dept: INTERNAL MEDICINE | Facility: CLINIC | Age: 85
End: 2020-01-01

## 2020-01-01 ENCOUNTER — HOSPITAL ENCOUNTER (OUTPATIENT)
Dept: GENERAL RADIOLOGY | Facility: HOSPITAL | Age: 85
Discharge: HOME | End: 2020-08-05
Admitting: INTERNAL MEDICINE

## 2020-01-01 ENCOUNTER — EPISODE CHANGES (OUTPATIENT)
Dept: CASE MANAGEMENT | Facility: OTHER | Age: 85
End: 2020-01-01

## 2020-01-01 ENCOUNTER — NURSE TRIAGE (OUTPATIENT)
Dept: CALL CENTER | Facility: HOSPITAL | Age: 85
End: 2020-01-01

## 2020-01-01 ENCOUNTER — FLU SHOT (OUTPATIENT)
Dept: INTERNAL MEDICINE | Facility: CLINIC | Age: 85
End: 2020-01-01

## 2020-01-01 VITALS
TEMPERATURE: 97.6 F | HEART RATE: 79 BPM | HEIGHT: 72 IN | OXYGEN SATURATION: 95 % | WEIGHT: 170 LBS | DIASTOLIC BLOOD PRESSURE: 89 MMHG | RESPIRATION RATE: 18 BRPM | BODY MASS INDEX: 23.03 KG/M2 | SYSTOLIC BLOOD PRESSURE: 139 MMHG

## 2020-01-01 VITALS
BODY MASS INDEX: 22.35 KG/M2 | SYSTOLIC BLOOD PRESSURE: 110 MMHG | OXYGEN SATURATION: 93 % | DIASTOLIC BLOOD PRESSURE: 68 MMHG | HEART RATE: 78 BPM | HEIGHT: 72 IN | WEIGHT: 165 LBS | TEMPERATURE: 99.3 F

## 2020-01-01 VITALS
WEIGHT: 180 LBS | BODY MASS INDEX: 24.38 KG/M2 | DIASTOLIC BLOOD PRESSURE: 80 MMHG | HEIGHT: 72 IN | SYSTOLIC BLOOD PRESSURE: 116 MMHG | OXYGEN SATURATION: 94 % | TEMPERATURE: 97.7 F | HEART RATE: 67 BPM

## 2020-01-01 VITALS
DIASTOLIC BLOOD PRESSURE: 80 MMHG | WEIGHT: 180.3 LBS | HEIGHT: 72 IN | SYSTOLIC BLOOD PRESSURE: 130 MMHG | HEART RATE: 77 BPM | BODY MASS INDEX: 24.42 KG/M2 | OXYGEN SATURATION: 91 % | TEMPERATURE: 97.3 F

## 2020-01-01 DIAGNOSIS — R09.02 COPD WITH HYPOXIA (HCC): ICD-10-CM

## 2020-01-01 DIAGNOSIS — I10 HTN (HYPERTENSION), BENIGN: ICD-10-CM

## 2020-01-01 DIAGNOSIS — J44.9 COPD WITH HYPOXIA (HCC): ICD-10-CM

## 2020-01-01 DIAGNOSIS — J44.1 COPD EXACERBATION (HCC): ICD-10-CM

## 2020-01-01 DIAGNOSIS — I50.32 CHRONIC DIASTOLIC HEART FAILURE (HCC): ICD-10-CM

## 2020-01-01 DIAGNOSIS — R09.02 HYPOXIA: ICD-10-CM

## 2020-01-01 DIAGNOSIS — Z23 NEED FOR IMMUNIZATION AGAINST INFLUENZA: Primary | ICD-10-CM

## 2020-01-01 DIAGNOSIS — J44.1 COPD EXACERBATION (HCC): Primary | ICD-10-CM

## 2020-01-01 DIAGNOSIS — R09.02 HYPOXIA: Primary | ICD-10-CM

## 2020-01-01 DIAGNOSIS — N48.1 BALANITIS: ICD-10-CM

## 2020-01-01 LAB
ANION GAP SERPL CALCULATED.3IONS-SCNC: 11 MMOL/L (ref 5–15)
BNP SERPL-MCNC: 592 PG/ML (ref 0–100)
BUN BLD-MCNC: 12 MG/DL (ref 8–23)
BUN SERPL-MCNC: 20 MG/DL (ref 8–23)
BUN SERPL-MCNC: 27 MG/DL (ref 8–23)
BUN/CREAT SERPL: 14.5 (ref 7–25)
BUN/CREAT SERPL: 18.2 (ref 7–25)
BUN/CREAT SERPL: 29.7 (ref 7–25)
CALCIUM SERPL-MCNC: 9 MG/DL (ref 8.2–9.6)
CALCIUM SERPL-MCNC: 9.2 MG/DL (ref 8.2–9.6)
CALCIUM SPEC-SCNC: 8.6 MG/DL (ref 8.2–9.6)
CHLORIDE SERPL-SCNC: 103 MMOL/L (ref 98–107)
CHLORIDE SERPL-SCNC: 103 MMOL/L (ref 98–107)
CHLORIDE SERPL-SCNC: 105 MMOL/L (ref 98–107)
CO2 SERPL-SCNC: 23.7 MMOL/L (ref 22–29)
CO2 SERPL-SCNC: 24.3 MMOL/L (ref 22–29)
CO2 SERPL-SCNC: 26 MMOL/L (ref 22–29)
CREAT BLD-MCNC: 0.83 MG/DL (ref 0.76–1.27)
CREAT SERPL-MCNC: 0.91 MG/DL (ref 0.76–1.27)
CREAT SERPL-MCNC: 1.1 MG/DL (ref 0.76–1.27)
DEPRECATED RDW RBC AUTO: 47.8 FL (ref 37–54)
ERYTHROCYTE [DISTWIDTH] IN BLOOD BY AUTOMATED COUNT: 14.6 % (ref 12.3–15.4)
GFR SERPL CREATININE-BSD FRML MDRD: 86 ML/MIN/1.73
GLUCOSE BLD-MCNC: 96 MG/DL (ref 65–99)
GLUCOSE SERPL-MCNC: 115 MG/DL (ref 65–99)
GLUCOSE SERPL-MCNC: 82 MG/DL (ref 65–99)
HCT VFR BLD AUTO: 42.6 % (ref 37.5–51)
HGB BLD-MCNC: 14.7 G/DL (ref 13–17.7)
MCH RBC QN AUTO: 30.8 PG (ref 26.6–33)
MCHC RBC AUTO-ENTMCNC: 34.5 G/DL (ref 31.5–35.7)
MCV RBC AUTO: 89.1 FL (ref 79–97)
PLATELET # BLD AUTO: 139 10*3/MM3 (ref 140–450)
PMV BLD AUTO: 9.8 FL (ref 6–12)
POTASSIUM BLD-SCNC: 3.2 MMOL/L (ref 3.5–5.2)
POTASSIUM SERPL-SCNC: 4.4 MMOL/L (ref 3.5–5.2)
POTASSIUM SERPL-SCNC: 4.5 MMOL/L (ref 3.5–5.2)
RBC # BLD AUTO: 4.78 10*6/MM3 (ref 4.14–5.8)
SODIUM BLD-SCNC: 142 MMOL/L (ref 136–145)
SODIUM SERPL-SCNC: 139 MMOL/L (ref 136–145)
SODIUM SERPL-SCNC: 140 MMOL/L (ref 136–145)
WBC NRBC COR # BLD: 8.6 10*3/MM3 (ref 3.4–10.8)

## 2020-01-01 PROCEDURE — 80048 BASIC METABOLIC PNL TOTAL CA: CPT | Performed by: INTERNAL MEDICINE

## 2020-01-01 PROCEDURE — 99214 OFFICE O/P EST MOD 30 MIN: CPT | Performed by: INTERNAL MEDICINE

## 2020-01-01 PROCEDURE — 90471 IMMUNIZATION ADMIN: CPT | Performed by: INTERNAL MEDICINE

## 2020-01-01 PROCEDURE — 85027 COMPLETE CBC AUTOMATED: CPT | Performed by: INTERNAL MEDICINE

## 2020-01-01 PROCEDURE — 94799 UNLISTED PULMONARY SVC/PX: CPT

## 2020-01-01 PROCEDURE — 71046 X-RAY EXAM CHEST 2 VIEWS: CPT

## 2020-01-01 PROCEDURE — 90694 VACC AIIV4 NO PRSRV 0.5ML IM: CPT | Performed by: INTERNAL MEDICINE

## 2020-01-01 PROCEDURE — 99232 SBSQ HOSP IP/OBS MODERATE 35: CPT | Performed by: NURSE PRACTITIONER

## 2020-01-01 RX ORDER — METRONIDAZOLE 500 MG/1
500 TABLET ORAL 3 TIMES DAILY
Qty: 21 TABLET | Refills: 0 | Status: SHIPPED | OUTPATIENT
Start: 2020-01-01 | End: 2020-01-01

## 2020-01-01 RX ORDER — IPRATROPIUM BROMIDE AND ALBUTEROL SULFATE 2.5; .5 MG/3ML; MG/3ML
3 SOLUTION RESPIRATORY (INHALATION)
Qty: 360 ML | Refills: 2 | Status: SHIPPED | OUTPATIENT
Start: 2020-01-01 | End: 2020-01-01 | Stop reason: SDUPTHER

## 2020-01-01 RX ORDER — FLUCONAZOLE 100 MG/1
100 TABLET ORAL DAILY
Qty: 3 TABLET | Refills: 0 | Status: SHIPPED | OUTPATIENT
Start: 2020-01-01

## 2020-01-01 RX ORDER — IPRATROPIUM BROMIDE AND ALBUTEROL SULFATE 2.5; .5 MG/3ML; MG/3ML
SOLUTION RESPIRATORY (INHALATION)
Qty: 360 ML | Refills: 0 | Status: SHIPPED | OUTPATIENT
Start: 2020-01-01 | End: 2020-01-01 | Stop reason: SDUPTHER

## 2020-01-01 RX ORDER — IPRATROPIUM BROMIDE AND ALBUTEROL SULFATE 2.5; .5 MG/3ML; MG/3ML
SOLUTION RESPIRATORY (INHALATION)
Qty: 360 ML | Refills: 2 | Status: SHIPPED | OUTPATIENT
Start: 2020-01-01

## 2020-01-01 RX ORDER — IPRATROPIUM BROMIDE AND ALBUTEROL SULFATE 2.5; .5 MG/3ML; MG/3ML
3 SOLUTION RESPIRATORY (INHALATION)
Qty: 360 ML | Refills: 2 | Status: SHIPPED | OUTPATIENT
Start: 2020-01-01 | End: 2020-01-01

## 2020-01-01 RX ORDER — CIPROFLOXACIN 500 MG/1
500 TABLET, FILM COATED ORAL 2 TIMES DAILY
Qty: 14 TABLET | Refills: 0 | Status: SHIPPED | OUTPATIENT
Start: 2020-01-01 | End: 2020-01-01

## 2020-01-01 RX ORDER — CEFDINIR 300 MG/1
300 CAPSULE ORAL 2 TIMES DAILY
Qty: 20 CAPSULE | Refills: 0 | Status: SHIPPED | OUTPATIENT
Start: 2020-01-01 | End: 2020-01-01

## 2020-01-01 RX ORDER — TAMSULOSIN HYDROCHLORIDE 0.4 MG/1
CAPSULE ORAL
Qty: 90 CAPSULE | Refills: 1 | Status: SHIPPED | OUTPATIENT
Start: 2020-01-01 | End: 2021-01-01

## 2020-01-01 RX ORDER — PREDNISONE 1 MG/1
5 TABLET ORAL DAILY
Qty: 90 TABLET | Refills: 3 | Status: SHIPPED | OUTPATIENT
Start: 2020-01-01

## 2020-01-01 RX ORDER — METHYLPREDNISOLONE 4 MG/1
TABLET ORAL
Qty: 21 TABLET | Refills: 0 | Status: SHIPPED | OUTPATIENT
Start: 2020-01-01 | End: 2020-01-01

## 2020-01-01 RX ORDER — POTASSIUM CHLORIDE 750 MG/1
10 CAPSULE, EXTENDED RELEASE ORAL DAILY
Qty: 90 CAPSULE | Refills: 3 | Status: SHIPPED | OUTPATIENT
Start: 2020-01-01

## 2020-01-01 RX ORDER — FUROSEMIDE 20 MG/1
20 TABLET ORAL 2 TIMES DAILY
Qty: 60 TABLET | Refills: 5 | Status: SHIPPED | OUTPATIENT
Start: 2020-01-01 | End: 2021-01-01

## 2020-01-01 RX ORDER — POTASSIUM CHLORIDE 750 MG/1
40 CAPSULE, EXTENDED RELEASE ORAL ONCE
Status: COMPLETED | OUTPATIENT
Start: 2020-01-01 | End: 2020-01-01

## 2020-01-01 RX ORDER — LEVOTHYROXINE SODIUM 0.07 MG/1
TABLET ORAL
Qty: 90 TABLET | Refills: 3 | Status: SHIPPED | OUTPATIENT
Start: 2020-01-01

## 2020-01-01 RX ADMIN — LEVOTHYROXINE SODIUM 75 MCG: 75 TABLET ORAL at 06:41

## 2020-01-01 RX ADMIN — FUROSEMIDE 20 MG: 20 TABLET ORAL at 09:11

## 2020-01-01 RX ADMIN — PANTOPRAZOLE SODIUM 40 MG: 40 TABLET, DELAYED RELEASE ORAL at 09:11

## 2020-01-01 RX ADMIN — IPRATROPIUM BROMIDE AND ALBUTEROL SULFATE 3 ML: 2.5; .5 SOLUTION RESPIRATORY (INHALATION) at 05:41

## 2020-01-01 RX ADMIN — POTASSIUM CHLORIDE 40 MEQ: 750 CAPSULE, EXTENDED RELEASE ORAL at 10:28

## 2020-01-01 RX ADMIN — AMLODIPINE BESYLATE 5 MG: 5 TABLET ORAL at 09:11

## 2020-01-01 RX ADMIN — METOPROLOL SUCCINATE 50 MG: 50 TABLET, FILM COATED, EXTENDED RELEASE ORAL at 09:11

## 2020-01-01 NOTE — PAYOR COMM NOTE
"DCHOME 1-1-20  [id number]  UR PHONE    [phone]    Broderick Gomez (89 y.o. Male)     Date of Birth Social Security Number Address Home Phone MRN    08/15/1926  [id number] [facility] [address]  [place] [address] [phone] [id number]    Restorationist Marital Status          Protestant        Admission Date Admission Type Admitting Provider Attending Provider Department, Room/Bed    12/30/19 Emergency Mj Robin, [facility] 3C, 371/1    Discharge Date Discharge Disposition Discharge Destination        1/1/2020 Home or Self Care              Attending Provider:  (none)   Allergies:  No Known Allergies    Isolation:  None   Infection:  None   Code Status:  Prior    Ht:  182.9 cm (72\")   Wt:  77.1 kg (170 lb)    Admission Cmt:  None   Principal Problem:  Colitis [K52.9]                 Active Insurance as of 12/30/2019     Primary Coverage     Payor Plan Insurance Group Employer/Plan Group    [employer] [id number]     Payor Plan Address Payor Plan Phone Number Payor Plan Fax Number Effective Dates    [address] [phone]  1/1/2018 - None Entered    [facility] [id number]       Subscriber Name Subscriber Birth Date Member ID       LUMA GOMEZ 4/27/1958 [id number]           Secondary Coverage     Payor Plan Insurance Group Employer/Plan Group    MEDICARE MEDICARE A ONLY      Payor Plan Address Payor Plan Phone Number Payor Plan Fax Number Effective Dates    [address] [phone]  8/1/1991 - None Entered    [facility] [id number]       Subscriber Name Subscriber Birth Date Member ID       BRODERICK GOMEZ 8/15/1926 [id number]                 Emergency Contacts      (Rel.) Home Phone Work Phone Mobile Phone    Luma Gomez (Spouse) [phone] -- --               Physician Progress Notes (last 72 hours) (Notes from 12/29/19 1531 through 01/01/20 1531)      Shnana Washington MD at 01/01/20 0910                [facility] " [facility] Gastroenterology  Inpatient Progress Note  Today's date:  01/01/20    Broderick Gomez  8/15/1926       Reason for Follow Up: GI bleed    Subjective: The patient is sitting up to chair with wife at bedside this morning.  He tells me that he is feeling much better.  He states that he ate well.  He and his wife report that he did have loose stool but has not seen any more blood or blood clots.    The patient denies any nausea, vomiting, epigastric pain, dysphagia, pyrosis or hematemesis.  The patient denies any fever or chills.  Denies any melena or hematochezia.  Denies any unintentional weight loss or loss of appetite.      No Known Allergies    Current Facility-Administered Medications:   •  acetaminophen (TYLENOL) tablet 650 mg, 650 mg, Oral, Q6H PRN, Mj Robin DO  •  amLODIPine (NORVASC) tablet 5 mg, 5 mg, Oral, Daily, Stanislav Love MD, 5 mg at 01/01/20 0911  •  cloNIDine (CATAPRES) tablet 0.1 mg, 0.1 mg, Oral, TID PRN, Stanislav Love MD  •  ertapenem (INVanz) 1 g/100 mL 0.9% NS VTB (mbp), 1 g, Intravenous, Q24H, Stanislav Love MD, 1 g at 12/31/19 2139  •  furosemide (LASIX) tablet 20 mg, 20 mg, Oral, Daily, Stanislav Love MD, 20 mg at 01/01/20 0911  •  ipratropium-albuterol (DUO-NEB) nebulizer solution 3 mL, 3 mL, Nebulization, 4x Daily - RT, Stanislav Love MD, 3 mL at 01/01/20 0541  •  levothyroxine (SYNTHROID, LEVOTHROID) tablet 75 mcg, 75 mcg, Oral, Q AM, Stanislav Love MD, 75 mcg at 01/01/20 0641  •  meclizine (ANTIVERT) tablet 25 mg, 25 mg, Oral, BID PRN, Stanislav Love MD  •  metoprolol succinate XL (TOPROL-XL) 24 hr tablet 50 mg, 50 mg, Oral, BID, Stanislav Love MD, 50 mg at 01/01/20 0911  •  ondansetron (ZOFRAN) injection 4 mg, 4 mg, Intravenous, Q6H PRN, Mj Robin, DO  •  pantoprazole (PROTONIX) EC tablet 40 mg, 40 mg, Oral, BID, Stanislav Love MD, 40 mg at 01/01/20 0911  •  potassium chloride (MICRO-K) CR capsule 40 mEq, 40 mEq, Oral,  Once, Mj Robin, DO  •  sodium chloride 0.9 % flush 10 mL, 10 mL, Intravenous, PRN, Stanislav Love MD  •  sodium chloride 0.9 % flush 10 mL, 10 mL, Intravenous, Q12H, Stanislav Love MD, 10 mL at 12/31/19 2140  •  sodium chloride 0.9 % flush 10 mL, 10 mL, Intravenous, PRN, Stanislav Love MD    Review of Systems:   Review of Systems   Constitutional: Negative for fever and unexpected weight change.   HENT: Negative for hearing loss.    Eyes: Negative for visual disturbance.   Respiratory: Negative for cough.    Cardiovascular: Negative for chest pain.   Gastrointestinal:        See HPI   Endocrine: Negative for cold intolerance and heat intolerance.   Genitourinary: Negative for dysuria.   Musculoskeletal: Negative for arthralgias.   Skin: Negative for rash.   Neurological: Negative for seizures.   Psychiatric/Behavioral: Negative for hallucinations.        Vital Signs:  Temp:  [97.6 °F (36.4 °C)-97.9 °F (36.6 °C)] 97.6 °F (36.4 °C)  Heart Rate:  [] 79  Resp:  [16-20] 18  BP: (136-154)/(63-89) 139/89  Body mass index is 23.06 kg/m².     Intake/Output Summary (Last 24 hours) at 1/1/2020 0958  Last data filed at 1/1/2020 0844  Gross per 24 hour   Intake 1082 ml   Output 1700 ml   Net -618 ml     I/O this shift:  In: 360 [P.O.:360]  Out: 100 [Urine:100]  Physical Exam:  Physical Exam   Constitutional: He appears well-developed.   Cardiovascular: Normal rate and regular rhythm.   Pulmonary/Chest: Effort normal.   Abdominal: Soft. Bowel sounds are normal.   Neurological: He is alert.   Psychiatric: He has a normal mood and affect.        Results Review:   I have reviewed all of the patient's current test results    Results from last 7 days   Lab Units 01/01/20  0618 12/31/19  1552 12/31/19  0804 12/31/19  0046 12/30/19  1513   WBC 10*3/mm3 8.60  --   --  16.26* 16.15*   HEMOGLOBIN g/dL 14.7 15.5 14.9 15.4 17.5   HEMATOCRIT % 42.6 45.0 43.1 45.2 50.9   PLATELETS 10*3/mm3 139*  --   --  139* 152        Results from last 7 days   Lab Units 01/01/20  0618 12/31/19  0046 12/30/19  1513   SODIUM mmol/L 142 142 141   POTASSIUM mmol/L 3.2* 3.9 3.6   CHLORIDE mmol/L 105 105 102   CO2 mmol/L 26.0 27.0 25.0   BUN mg/dL 12 16 17   CREATININE mg/dL 0.83 0.82 0.90   CALCIUM mg/dL 8.6 8.8 9.1   BILIRUBIN mg/dL  --  1.5* 1.3*   ALK PHOS U/L  --  81 88   ALT (SGPT) U/L  --  <5 10   AST (SGOT) U/L  --  17 13   GLUCOSE mg/dL 96 126* 152*       Results from last 7 days   Lab Units 12/30/19  1513   INR  1.19*       Lab Results   Lab Value Date/Time    LIPASE 104 04/28/2016 2140       Radiology Review:  Imaging Results (Last 24 Hours)     ** No results found for the last 24 hours. **          Impression/Plan:  Patient Active Problem List   Diagnosis Code   • HTN (hypertension), benign I10   • Esophageal dysphagia R13.10   • Colitis K52.9   • Lower GI bleed K92.2   • Atrial fibrillation (CMS/HCC) I48.91   • Sinoatrial node dysfunction (CMS/[facility]) I49.5   • Pleural effusion J90   • Chronic diastolic heart failure (CMS/[facility]) I50.32     GI bleed/diverticulitis  Agree with antibiotics for diverticulitis.  No plans for colonoscopy in the setting.  Advance diet as tolerated.  Home when able to take po antibiotics.  GI signing off.    DEVAN Alvarado  01/01/20  9:58 AM     Shanna Washington MD  [facility] Gastroenterology  01/01/20  10:23 AM      Electronically signed by Shanna Washington MD at 01/01/20 1217     Mj Robin DO at 12/31/19 1340              [facility] Medicine Services  INPATIENT PROGRESS NOTE    Patient Name: Broderick Gomez  Date of Admission: 12/30/2019  Today's Date: 12/31/19  Length of Stay: 1  Primary Care Physician: Pete Rubio MD    Subjective   Chief Complaint: Rectal bleeding.   HPI   I know the patient's wife well.  Her name is Luma.  She was a critical care nurse here for several years prior to retiring.  She now works at the [employer].    She tells me that he  "had a few bouts of loose stool 2 days ago.  He has recently been on Levaquin and prednisone for COPD exacerbation at the direction of Dr. Rubio.  He has not had any loose stool since then.  He had a bowel movement last night that contain blood clots.  The stool was described as mulberry.  It \"smelled like a GI bleed.\"    He states he feels fine today.  He denies any abdominal pain.  No nausea or vomiting.  He had a colonoscopy remotely with Dr. Del Castillo with no known issues.  He had an endoscopy with Dr. Del Castillo in December 2018.  This showed reflux esophagitis and duodenal erosions.    His wife does not believe that his blood pressure has been low recently.  He has no history of diverticulitis or ischemic colitis in the past.    Review of Systems   All pertinent negatives and positives are as above. All other systems have been reviewed and are negative unless otherwise stated.     Objective    Temp:  [97.6 °F (36.4 °C)-98.9 °F (37.2 °C)] 97.6 °F (36.4 °C)  Heart Rate:  [56-98] 92  Resp:  [16-18] 16  BP: (121-150)/(63-88) 137/63  Physical Exam   Constitutional: He is oriented to person, place, and time. He appears well-developed and well-nourished.   Up in the chair.  No distress.  His wife is present with him.  Discussed with his nurse, Donna.   HENT:   Head: Normocephalic and atraumatic.   Eyes: Pupils are equal, round, and reactive to light. Conjunctivae and EOM are normal.   Neck: Neck supple. No JVD present.   Cardiovascular: Normal rate, regular rhythm, normal heart sounds and intact distal pulses. Exam reveals no gallop and no friction rub.   No murmur heard.  Pulmonary/Chest: Effort normal and breath sounds normal. No respiratory distress. He has no wheezes. He has no rales. He exhibits no tenderness.   Abdominal: Soft. Bowel sounds are normal. He exhibits no distension. There is no tenderness. There is no rebound and no guarding.   Musculoskeletal: Normal range of motion. He exhibits edema. He exhibits " no tenderness or deformity.   Neurological: He is alert and oriented to person, place, and time. He displays normal reflexes. No cranial nerve deficit. He exhibits normal muscle tone.   Skin: Skin is warm and dry. No rash noted.   Psychiatric: He has a normal mood and affect. His behavior is normal. Judgment and thought content normal.     Results Review:  I have reviewed the labs, radiology results, and diagnostic studies.    Laboratory Data:   Results from last 7 days   Lab Units 12/31/19  0804 12/31/19  0046 12/30/19  1513   WBC 10*3/mm3  --  16.26* 16.15*   HEMOGLOBIN g/dL 14.9 15.4 17.5   HEMATOCRIT % 43.1 45.2 50.9   PLATELETS 10*3/mm3  --  139* 152      Results from last 7 days   Lab Units 12/31/19  0046 12/30/19  1513   SODIUM mmol/L 142 141   POTASSIUM mmol/L 3.9 3.6   CHLORIDE mmol/L 105 102   CO2 mmol/L 27.0 25.0   BUN mg/dL 16 17   CREATININE mg/dL 0.82 0.90   CALCIUM mg/dL 8.8 9.1   BILIRUBIN mg/dL 1.5* 1.3*   ALK PHOS U/L 81 88   ALT (SGPT) U/L <5 10   AST (SGOT) U/L 17 13   GLUCOSE mg/dL 126* 152*     Radiology Data:   Imaging Results (Last 24 Hours)     Procedure Component Value Units Date/Time    CT Abdomen Pelvis With Contrast [[id number]] Collected:  12/30/19 1849     Updated:  12/30/19 1856    Narrative:       CT ABDOMEN PELVIS W CONTRAST- 12/30/2019 6:38 PM CST     HISTORY: Abd pain, fever, abscess suspected       COMPARISON: None.      DOSE LENGTH PRODUCT: 254 mGy cm. Automated exposure control was also  utilized to decrease patient radiation dose.     TECHNIQUE: Following the intravenous administration of contrast, helical  CT tomographic images of the abdomen and pelvis were acquired.  Multiplanar reformatted images were provided for review.      FINDINGS:   LOWER CHEST: A moderate to large RIGHT pleural effusion is present. A  small LEFT pleural effusion is noted. Compressive atelectasis is seen in  the lungs. The RIGHT heart is dilated.      LIVER: No focal liver lesion. The hepatic  vasculature is patent.      BILIARY SYSTEM: High density material in the gallbladder wall is likely  due to adenomyomatosis.      PANCREAS: No focal pancreatic lesion.      SPLEEN: Unremarkable.      KIDNEYS: Bilateral kidneys are unremarkable. The ureters are  decompressed and normal in appearance.     ADRENALS: Unremarkable.     RETROPERITONEUM: No mass, lymphadenopathy or hemorrhage.      GI TRACT: There is mild thickening of the distal transverse colon wall.  A few colonic diverticula are present. There is a moderate-sized LEFT  inguinal hernia containing a nonobstructed loop of small bowel. The  appendix is visualized and unremarkable.     OTHER: There is no mesenteric mass, lymphadenopathy or fluid collection.  Severe atherosclerosis is present. There is approximately 50% stenosis  of the aorta throughout its course due to intraluminal thrombus.  Degenerative changes are seen in the spine. No worrisome bony lesions  are identified.     PELVIS: No mass lesion, fluid collection or significant lymphadenopathy  is seen in the pelvis. The urinary bladder is mildly distended. The  prostate is enlarged.       Impression:       1. Short segment of colitis or diverticulitis in the distal transverse  colon. No perforation or abscess.  2. Bilateral pleural effusions and evidence of RIGHT heart failure.  3. Moderate to severe atherosclerosis in the aorta with atheromatous  plaque causing at least 50% stenosis of the aorta.  4. Prostatomegaly.  5. Nonobstructive LEFT inguinal hernia.        This report was finalized on 12/30/2019 18:53 by Dr. Shorty Lee MD.    XR Chest 1 View [[id number]] Collected:  12/30/19 1819     Updated:  12/30/19 1823    Narrative:       XR CHEST 1 VW- 12/30/2019     HISTORY: Congestion       COMPARISON: 11/14/2019.     FINDINGS:   A moderate-sized RIGHT pleural effusion is again noted. The the LEFT  lung is clear. The cardiac silhouette is unchanged. Pacemaker leads are  again noted.      The  osseous structures and surrounding soft tissues demonstrate no acute  abnormality.       Impression:       1. Moderate-sized RIGHT pleural effusion is again seen.  2. No significant interval change since the previous study.  This report was finalized on 12/30/2019 18:19 by Dr. Shorty Lee MD.        I have reviewed the patient's current medications.     Assessment/Plan     Active Hospital Problems    Diagnosis   • **Colitis   • Lower GI bleed   • Atrial fibrillation (CMS/HCC)   • Sinoatrial node dysfunction (CMS/HCC)   • Chronic diastolic heart failure (CMS/HCC)   • Pleural effusion   • HTN (hypertension), benign     Plan:   The patient was admitted on 12/30 by Dr. Love after presenting to the emergency department with complaints of rectal bleeding for 1 day duration.  He was found to have a leukocytosis on laboratory work-up.  He was found to have a short segment of colitis versus diverticulitis in the distal transverse colon with no perforation or abscess.  He was admitted for close observation and IV antibiotics.  GI was also consulted.    He was started on Invanz at presentation.  He has no other antibiotic allergies.    Dr. Washington with GI was consulted.  Opinion from her forthcoming.  The patient has been seen by the nurse practitioner for the GI service.    Aspirin is on hold.  Hemoglobin stable.    He has a history of paroxysmal atrial fibrillation.  He has history of sick sinus syndrome and has a pacemaker.  He has not been on full dose anticoagulation in the past.    Appropriate home medications have been resumed.     SCDs for DVT prophylaxis.    Discharge Planning: I expect the patient to be discharged to home in 1-3 days.    Mj Robin DO   12/31/19   1:40 PM      Electronically signed by Mj Robin DO at 12/31/19 1414          Consult Notes (last 72 hours) (Notes from 12/29/19 1531 through 01/01/20 1531)      Shanna Washington MD at 12/31/19 0880      Consult Orders    1. Inpatient  Gastroenterology Consult [[id number]] ordered by Stanislav Love MD at 12/31/19 0011                        General acute hospital Gastroenterology  Inpatient Consult Note  Today's date:  12/31/19    Broderick Gomez  8/15/1926       Referring Provider: Stanislav Love MD  Primary Physician: Pete Rubio MD   Primary Gastroenterologist: Dr. Fernando Del aCstillo    Date of Admission: 12/30/2019  Date of Service:  12/31/19    Reason for Consultation/Chief Complaint: GI bleed    History of present illness: This is a very pleasant 89-year-old male who tells me that yesterday he began to notice bright red blood as well as mulberry stools occurring twice.  He states that the day prior he did have some abdominal cramping and some constipation.  He reports last night in the ER with mulberry stools and today clots only.  The patient's wife is at bedside and confirms all of this.    The patient states that he was recently treated with steroid Dosepak as well as Levaquin for recent upper respiratory tract infection.The patient underwent an EGD on 12/13/2018 per Dr. Fernando Del Castillo for dysphagia.  Findings of duodenal erosions without bleeding.  LA grade D reflux esophagitis biopsied.  Initiated Protonix 40 mg twice daily.  The patient's last colonoscopy was performed on 5/16/2012 for heme positive stool with findings of multiple adenomatous polyps.  No evidence of malignancy on biopsies.    The patient denies any nausea, vomiting, epigastric pain, dysphagia, pyrosis or hematemesis.  The patient denies any fever or chills.  Denies any melena .  Denies any unintentional weight loss or loss of appetite.    Labs and imaging: CMP hyperglycemia, total bilirubin 1.5.  CBC reveals a WBC of 16 with left shift.  Hemoglobin 15 now decreased to 14.  Stools positive occult blood.      Study Result     CT ABDOMEN PELVIS W CONTRAST- 12/30/2019 6:38 PM CST     HISTORY: Abd pain, fever, abscess suspected       COMPARISON: None.    IMPRESSION:  1.  Short segment of colitis or diverticulitis in the distal transverse  colon. No perforation or abscess.  2. Bilateral pleural effusions and evidence of RIGHT heart failure.  3. Moderate to severe atherosclerosis in the aorta with atheromatous  plaque causing at least 50% stenosis of the aorta.  4. Prostatomegaly.  5. Nonobstructive LEFT inguinal hernia.        This report was finalized on 12/30/2019 18:53 by Dr. Shorty Lee MD.      Past Medical History:   Diagnosis Date   • Acute right lumbar radiculopathy    • Arthritis    • Ataxia    • Atrial fibrillation (CMS/HCC)    • Bilateral edema of lower extremity    • BPPV (benign paroxysmal positional vertigo), bilateral    • Carotid occlusion, bilateral    • Cerebral microvascular disease    • CVA (cerebral vascular accident) (CMS/HCC)    • Elevated PSA    • GERD (gastroesophageal reflux disease)    • Hypercholesteremia    • Hypertension    • Inguinal hernia    • Rhinophyma    • Sinoatrial node dysfunction (CMS/HCC)    • Skin cancer    • Thrombosis of thoracic aorta (CMS/HCC)    • Total bilirubin, elevated    • TSH elevation        Past Surgical History:   Procedure Laterality Date   • CATARACT EXTRACTION, BILATERAL     • COLONOSCOPY  05/16/2012    adenomatous polyp @ cecum, hepatic flexure, 40cm, and 35cm, multiple diverticula in sigmoid and descending colon, recall 3 years, Dr. Del Castillo   • ENDOSCOPY N/A 12/13/2018    Procedure: ESOPHAGOGASTRODUODENOSCOPY WITH ANESTHESIA;  Surgeon: Fernando Del Castillo MD;  Location: [facility] ENDOSCOPY;  Service: Gastroenterology   • EYE SURGERY      eyelid replacement   • HEMORRHOIDECTOMY     • INGUINAL HERNIA REPAIR Right 2008   • KNEE SURGERY Right    • ROTATOR CUFF REPAIR Right 2000   • SKIN CANCER EXCISION          No Known Allergies    Medications Prior to Admission   Medication Sig Dispense Refill Last Dose   • ipratropium-albuterol (DUO-NEB) 0.5-2.5 mg/3 ml nebulizer Take 3 mL by nebulization 4 (Four) Times a Day.      •  levothyroxine (SYNTHROID, LEVOTHROID) 75 MCG tablet Take 75 mcg by mouth Daily.      • amLODIPine (NORVASC) 5 MG tablet Take 5 mg by mouth Daily.   12/13/2018 at 0600   • aspirin 325 MG tablet Take 325 mg by mouth Daily.   12/9/2018   • CloNIDine (CATAPRES) 0.1 MG tablet Take 0.1 mg by mouth 3 (Three) Times a Day As Needed for High Blood Pressure.   12/12/2018 at Unknown time   • furosemide (LASIX) 20 MG tablet Take 20 mg by mouth Daily.   12/12/2018 at Unknown time   • meclizine (ANTIVERT) 25 MG tablet Take 25 mg by mouth 2 (Two) Times a Day As Needed for dizziness.   12/13/2018 at 0600   • metoprolol succinate XL (TOPROL-XL) 50 MG 24 hr tablet Take 50 mg by mouth 2 (Two) Times a Day.   12/13/2018 at 0600   • pantoprazole (PROTONIX) 40 MG EC tablet Take 1 tablet by mouth 2 (Two) Times a Day. 180 tablet 3    • sildenafil (VIAGRA) 100 MG tablet Take 50 mg by mouth Daily As Needed for erectile dysfunction.   Unknown at Unknown time       Hospital Medications (active)       Dose Frequency Start End    amLODIPine (NORVASC) tablet 5 mg 5 mg Daily 12/31/2019     Admin Instructions: Caution: Look alike/sound alike drug alert. Avoid grapefruit juice.    Route: Oral    cloNIDine (CATAPRES) tablet 0.1 mg 0.1 mg 3 Times Daily PRN 12/31/2019     Admin Instructions: Caution: Look alike/sound alike drug alert. Please read the label.<BR>Caution: Look alike/sound alike drug alert.    Route: Oral    ertapenem (INVanz) 1 g/100 mL 0.9% NS VTB (mbp) 1 g Every 24 Hours 12/31/2019 1/7/2020    Admin Instructions: Caution: Look alike/sound alike drug alert.    Route: Intravenous    furosemide (LASIX) tablet 20 mg 20 mg Daily 12/31/2019     Route: Oral    ipratropium-albuterol (DUO-NEB) nebulizer solution 3 mL 3 mL 4 Times Daily - RT 12/31/2019     Route: Nebulization    levothyroxine (SYNTHROID, LEVOTHROID) tablet 75 mcg 75 mcg Every Early Morning 12/31/2019     Admin Instructions: Take on empty stomach.    Route: Oral    meclizine  (ANTIVERT) tablet 25 mg 25 mg 2 Times Daily PRN 12/31/2019     Route: Oral    metoprolol succinate XL (TOPROL-XL) 24 hr tablet 50 mg 50 mg 2 Times Daily 12/31/2019     Admin Instructions: Do not crush or chew.    Route: Oral    pantoprazole (PROTONIX) EC tablet 40 mg 40 mg 2 Times Daily 12/31/2019     Admin Instructions: Swallow whole; do not crush, split, or chew.    Route: Oral    sodium chloride 0.9 % flush 10 mL 10 mL As Needed 12/30/2019     Route: Intravenous    Cosign for Ordering: Accepted by Jose Infante MD on 12/30/2019 10:41 PM    sodium chloride 0.9 % flush 10 mL 10 mL Every 12 Hours Scheduled 12/31/2019     Route: Intravenous    sodium chloride 0.9 % flush 10 mL 10 mL As Needed 12/31/2019     Route: Intravenous    sodium chloride 0.9 % infusion 50 mL/hr Continuous 12/31/2019     Route: Intravenous          Social History     Tobacco Use   • Smoking status: Never Smoker   • Smokeless tobacco: Current User     Types: Snuff   Substance Use Topics   • Alcohol use: Yes     Comment: occasional        Past Family History:  Family History   Problem Relation Age of Onset   • Colon cancer Neg Hx    • Colon polyps Neg Hx        Review of Systems:  Review of Systems   Constitutional: Negative for fever and unexpected weight change.   HENT: Negative for hearing loss.    Eyes: Negative for visual disturbance.   Respiratory: Negative for cough.    Cardiovascular: Negative for chest pain.   Gastrointestinal:        See HPI   Endocrine: Negative for cold intolerance and heat intolerance.   Genitourinary: Negative for dysuria.   Musculoskeletal: Negative for arthralgias.   Skin: Negative for rash.   Neurological: Negative for seizures.   Psychiatric/Behavioral: Negative for hallucinations.       Physical Exam:  Temp:  [97.6 °F (36.4 °C)-98.9 °F (37.2 °C)] 97.6 °F (36.4 °C)  Heart Rate:  [56-98] 92  Resp:  [16-18] 16  BP: (121-150)/(63-88) 137/63  Body mass index is 23.06 kg/m².    Intake/Output Summary (Last 24 hours)  at 12/31/2019 1047  Last data filed at 12/31/2019 1027  Gross per 24 hour   Intake --   Output 400 ml   Net -400 ml     I/O this shift:  In: -   Out: 400 [Urine:400]  Physical Exam   Constitutional: He is oriented to person, place, and time. He appears well-developed and well-nourished.   HENT:   Mouth/Throat: Oropharynx is clear and moist.   Eyes: EOM are normal.   Cardiovascular: Normal rate, regular rhythm and normal heart sounds. Exam reveals no gallop and no friction rub.   No murmur heard.  Pulmonary/Chest: Effort normal and breath sounds normal. He has no wheezes. He has no rales.   Abdominal: Soft. Bowel sounds are normal. He exhibits no distension. There is no hepatosplenomegaly. There is no tenderness. There is no rigidity, no rebound and no guarding.   Musculoskeletal: Normal range of motion. He exhibits no edema, tenderness or deformity.   Neurological: He is alert and oriented to person, place, and time.   Skin: Skin is warm and dry. No rash noted.   Psychiatric: He has a normal mood and affect. His behavior is normal. Judgment and thought content normal.   Vitals reviewed.      Results Review:  Lab Results (last 24 hours)     Procedure Component Value Units Date/Time    Hemoglobin & Hematocrit, Blood [[id number]]  (Normal) Collected:  12/31/19 0804    Specimen:  Blood Updated:  12/31/19 0816     Hemoglobin 14.9 g/dL      Hematocrit 43.1 %     Comprehensive Metabolic Panel [[id number]]  (Abnormal) Collected:  12/31/19 0046    Specimen:  Blood Updated:  12/31/19 0124     Glucose 126 mg/dL      BUN 16 mg/dL      Creatinine 0.82 mg/dL      Sodium 142 mmol/L      Potassium 3.9 mmol/L      Chloride 105 mmol/L      CO2 27.0 mmol/L      Calcium 8.8 mg/dL      Total Protein 5.9 g/dL      Albumin 3.20 g/dL      ALT (SGPT) <5 U/L      AST (SGOT) 17 U/L      Alkaline Phosphatase 81 U/L      Total Bilirubin 1.5 mg/dL      eGFR Non African Amer 88 mL/min/1.73      Globulin 2.7 gm/dL      A/G Ratio 1.2 g/dL       BUN/Creatinine Ratio 19.5     Anion Gap 10.0 mmol/L     Narrative:       GFR Normal >60  Chronic Kidney Disease <60  Kidney Failure <15      Lactic Acid, Plasma [[id number]]  (Normal) Collected:  12/31/19 0046    Specimen:  Blood Updated:  12/31/19 0120     Lactate 1.3 mmol/L     Blood Culture - Blood, Arm, Left [[id number]] Collected:  12/31/19 0108    Specimen:  Blood from Arm, Left Updated:  12/31/19 0117    Blood Culture - Blood, Arm, Left [[id number]] Collected:  12/31/19 0108    Specimen:  Blood from Arm, Left Updated:  12/31/19 0117    CBC Auto Differential [[id number]]  (Abnormal) Collected:  12/31/19 0046    Specimen:  Blood Updated:  12/31/19 0109     WBC 16.26 10*3/mm3      RBC 5.02 10*6/mm3      Hemoglobin 15.4 g/dL      Hematocrit 45.2 %      MCV 90.0 fL      MCH 30.7 pg      MCHC 34.1 g/dL      RDW 15.0 %      RDW-SD 49.5 fl      MPV 9.5 fL      Platelets 139 10*3/mm3      Neutrophil % 80.8 %      Lymphocyte % 9.7 %      Monocyte % 6.6 %      Eosinophil % 2.0 %      Basophil % 0.5 %      Immature Grans % 0.4 %      Neutrophils, Absolute 13.13 10*3/mm3      Lymphocytes, Absolute 1.57 10*3/mm3      Monocytes, Absolute 1.08 10*3/mm3      Eosinophils, Absolute 0.33 10*3/mm3      Basophils, Absolute 0.08 10*3/mm3      Immature Grans, Absolute 0.07 10*3/mm3      nRBC 0.0 /100 WBC     Magnesium [[id number]]  (Normal) Collected:  12/30/19 1513    Specimen:  Blood Updated:  12/31/19 0051     Magnesium 2.0 mg/dL     Phosphorus [[id number]]  (Abnormal) Collected:  12/30/19 1513    Specimen:  Blood Updated:  12/31/19 0051     Phosphorus 2.3 mg/dL     TSH [[id number]]  (Normal) Collected:  12/30/19 1513    Specimen:  Blood Updated:  12/31/19 0051     TSH 2.990 uIU/mL     T4, Free [[id number]]  (Normal) Collected:  12/30/19 1513    Specimen:  Blood Updated:  12/31/19 0051     Free T4 1.51 ng/dL     Urinalysis, Microscopic Only - Urine, Clean Catch [[id number]]  (Abnormal) Collected:  12/30/19 1823    Specimen:  Urine, Clean  Catch Updated:  12/30/19 1921     RBC, UA 6-12 /HPF      WBC, UA 0-2 /HPF      Bacteria, UA None Seen /HPF      Squamous Epithelial Cells, UA 0-2 /HPF      Hyaline Casts, UA None Seen /LPF      Methodology Manual Light Microscopy    Urinalysis With Culture If Indicated - Urine, Clean Catch [[id number]]  (Abnormal) Collected:  12/30/19 1823    Specimen:  Urine, Clean Catch Updated:  12/30/19 1852     Color, UA Dark Yellow     Appearance, UA Clear     pH, UA <=5.0     Specific Gravity, UA 1.025     Glucose, UA Negative     Ketones, UA Negative     Bilirubin, UA Small (1+)     Blood, UA Trace     Protein,  mg/dL (2+)     Leuk Esterase, UA Trace     Nitrite, UA Positive     Urobilinogen, UA 0.2 E.U./dL    [place] Draw [[id number]] Collected:  12/30/19 1513    Specimen:  Blood Updated:  12/30/19 1615    Narrative:       The following orders were created for panel order [place] Draw.  Procedure                               Abnormality         Status                     ---------                               -----------         ------                     Light Blue Top[[id number]]                                   Final result               Green Top (Gel)[[id number]]                                  Final result               Lavender Top[[id number]]                                     Final result               Red Top[[id number]]                                          Final result                 Please view results for these tests on the individual orders.    Light Blue Top [[id number]] Collected:  12/30/19 1513    Specimen:  Blood Updated:  12/30/19 1615     Extra Tube hold for add-on     Comment: Auto resulted       Red Top [[id number]] Collected:  12/30/19 1513    Specimen:  Blood Updated:  12/30/19 1615     Extra Tube Hold for add-ons.     Comment: Auto resulted.       Green Top (Gel) [[id number]] Collected:  12/30/19 1513    Specimen:  Blood Updated:  12/30/19 1615     Extra Tube Hold for add-ons.     Comment:  Auto resulted.       Lavender Top [[id number]] Collected:  12/30/19 1513    Specimen:  Blood Updated:  12/30/19 1615     Extra Tube hold for add-on     Comment: Auto resulted       Comprehensive Metabolic Panel [[id number]]  (Abnormal) Collected:  12/30/19 1513    Specimen:  Blood Updated:  12/30/19 1608     Glucose 152 mg/dL      BUN 17 mg/dL      Creatinine 0.90 mg/dL      Sodium 141 mmol/L      Potassium 3.6 mmol/L      Chloride 102 mmol/L      CO2 25.0 mmol/L      Calcium 9.1 mg/dL      Total Protein 6.9 g/dL      Albumin 3.90 g/dL      ALT (SGPT) 10 U/L      AST (SGOT) 13 U/L      Alkaline Phosphatase 88 U/L      Total Bilirubin 1.3 mg/dL      eGFR Non African Amer 79 mL/min/1.73      Globulin 3.0 gm/dL      A/G Ratio 1.3 g/dL      BUN/Creatinine Ratio 18.9     Anion Gap 14.0 mmol/L     Narrative:       GFR Normal >60  Chronic Kidney Disease <60  Kidney Failure <15      POC Occult Blood Stool [[id number]]  (Abnormal) Collected:  12/30/19 1605    Specimen:  Stool Updated:  12/30/19 1607     Fecal Occult Blood Positive     Lot Number [id number]     Expiration Date 09/30/2020     DEVELOPER LOT NUMBER 165     DEVELOPER EXPIRATION DATE 09/30/2020     Positive Control Positive     Negative Control Negative    Protime-INR [[id number]]  (Abnormal) Collected:  12/30/19 1513    Specimen:  Blood Updated:  12/30/19 1600     Protime 15.5 Seconds      INR 1.19    aPTT [[id number]]  (Normal) Collected:  12/30/19 1513    Specimen:  Blood Updated:  12/30/19 1600     PTT 33.0 seconds     CBC & Differential [[id number]] Collected:  12/30/19 1513    Specimen:  Blood Updated:  12/30/19 1554    Narrative:       The following orders were created for panel order CBC & Differential.  Procedure                               Abnormality         Status                     ---------                               -----------         ------                     CBC Auto Differential[[id number]]        Abnormal            Final result                 Please  view results for these tests on the individual orders.    CBC Auto Differential [[id number]]  (Abnormal) Collected:  12/30/19 1513    Specimen:  Blood Updated:  12/30/19 1554     WBC 16.15 10*3/mm3      RBC 5.62 10*6/mm3      Hemoglobin 17.5 g/dL      Hematocrit 50.9 %      MCV 90.6 fL      MCH 31.1 pg      MCHC 34.4 g/dL      RDW 15.1 %      RDW-SD 49.5 fl      MPV 9.5 fL      Platelets 152 10*3/mm3      Neutrophil % 84.3 %      Lymphocyte % 6.6 %      Monocyte % 6.7 %      Eosinophil % 1.4 %      Basophil % 0.6 %      Immature Grans % 0.4 %      Neutrophils, Absolute 13.62 10*3/mm3      Lymphocytes, Absolute 1.07 10*3/mm3      Monocytes, Absolute 1.08 10*3/mm3      Eosinophils, Absolute 0.22 10*3/mm3      Basophils, Absolute 0.09 10*3/mm3      Immature Grans, Absolute 0.07 10*3/mm3      nRBC 0.0 /100 WBC           Radiology Review:  Imaging Results (Last 72 Hours)     Procedure Component Value Units Date/Time    CT Abdomen Pelvis With Contrast [[id number]] Collected:  12/30/19 1849     Updated:  12/30/19 1856    Narrative:       CT ABDOMEN PELVIS W CONTRAST- 12/30/2019 6:38 PM CST     HISTORY: Abd pain, fever, abscess suspected       COMPARISON: None.      DOSE LENGTH PRODUCT: 254 mGy cm. Automated exposure control was also  utilized to decrease patient radiation dose.     TECHNIQUE: Following the intravenous administration of contrast, helical  CT tomographic images of the abdomen and pelvis were acquired.  Multiplanar reformatted images were provided for review.      FINDINGS:   LOWER CHEST: A moderate to large RIGHT pleural effusion is present. A  small LEFT pleural effusion is noted. Compressive atelectasis is seen in  the lungs. The RIGHT heart is dilated.      LIVER: No focal liver lesion. The hepatic vasculature is patent.      BILIARY SYSTEM: High density material in the gallbladder wall is likely  due to adenomyomatosis.      PANCREAS: No focal pancreatic lesion.      SPLEEN: Unremarkable.      KIDNEYS:  Bilateral kidneys are unremarkable. The ureters are  decompressed and normal in appearance.     ADRENALS: Unremarkable.     RETROPERITONEUM: No mass, lymphadenopathy or hemorrhage.      GI TRACT: There is mild thickening of the distal transverse colon wall.  A few colonic diverticula are present. There is a moderate-sized LEFT  inguinal hernia containing a nonobstructed loop of small bowel. The  appendix is visualized and unremarkable.     OTHER: There is no mesenteric mass, lymphadenopathy or fluid collection.  Severe atherosclerosis is present. There is approximately 50% stenosis  of the aorta throughout its course due to intraluminal thrombus.  Degenerative changes are seen in the spine. No worrisome bony lesions  are identified.     PELVIS: No mass lesion, fluid collection or significant lymphadenopathy  is seen in the pelvis. The urinary bladder is mildly distended. The  prostate is enlarged.       Impression:       1. Short segment of colitis or diverticulitis in the distal transverse  colon. No perforation or abscess.  2. Bilateral pleural effusions and evidence of RIGHT heart failure.  3. Moderate to severe atherosclerosis in the aorta with atheromatous  plaque causing at least 50% stenosis of the aorta.  4. Prostatomegaly.  5. Nonobstructive LEFT inguinal hernia.        This report was finalized on 12/30/2019 18:53 by Dr. Shorty Lee MD.    XR Chest 1 View [[id number]] Collected:  12/30/19 1819     Updated:  12/30/19 1823    Narrative:       XR CHEST 1 VW- 12/30/2019     HISTORY: Congestion       COMPARISON: 11/14/2019.     FINDINGS:   A moderate-sized RIGHT pleural effusion is again noted. The the LEFT  lung is clear. The cardiac silhouette is unchanged. Pacemaker leads are  again noted.      The osseous structures and surrounding soft tissues demonstrate no acute  abnormality.       Impression:       1. Moderate-sized RIGHT pleural effusion is again seen.  2. No significant interval change since the  previous study.  This report was finalized on 12/30/2019 18:19 by Dr. Shorty Lee MD.          Impression/Plan:  Patient Active Problem List   Diagnosis Code   • HTN (hypertension), benign I10   • Esophageal dysphagia R13.10   • Colitis K52.9   • Lower GI bleed K92.2   • Atrial fibrillation (CMS/LTAC, located within [facility]) I48.91   • Sinoatrial node dysfunction (CMS/LTAC, located within [facility]) I49.5   • Pleural effusion J90     GI bleed/diverticulitis  He hasn't had further bleeding.  Agree with antibiotics for diverticulitis.  No plans for colonoscopy in this setting.  OK clears and advance as tolerates.    DEVAN Alvarado  12/31/19   10:47 AM     Patient Seen, Chart, Consults, Notes, Labs, Radiology studies reviewed    I have seen and examined patient personally, performing a face-to-face diagnostic evaluation with plan of care reviewed and developed with APRN and nursing staff. I have addended and/or modified the above history of present illness, physical examination, and assessment and plan to reflect my findings and impressions. Essential elements of the care plan were discussed with APRN above.  Agree with findings and assessment/plan as documented above    Shanna Washington MD  [facility]  12/31/19  4:16 PM    Much of this encounter note is an electronic transcription/translation of spoken language to printed text. The electronic translation of spoken language may permit erroneous, or at times, nonsensical words or phrases to be inadvertently transcribed; although I have reviewed the note for such errors, some may still exist.        Electronically signed by Shanna Washington MD at 12/31/19 1617          Discharge Summary      Mj Robin DO at 01/01/20 1203                [facility] Medicine Services  DISCHARGE SUMMARY       Date of Admission: 12/30/2019  Date of Discharge:  1/1/2020  Primary Care Physician: Pete Rubio MD    Presenting Problem/History of Present Illness:  Rectal bleeding.      Final Discharge Diagnoses:  Active Hospital Problems    Diagnosis   • **Colitis   • Lower GI bleed   • Atrial fibrillation (CMS/HCC)   • Sinoatrial node dysfunction (CMS/HCC)   • Chronic diastolic heart failure (CMS/HCC)   • Pleural effusion   • HTN (hypertension), benign     Consults: Dr. Washington with GI.     Procedures Performed: None.     Pertinent Test Results:   Imaging Results (Last 7 Days)     Procedure Component Value Units Date/Time    CT Abdomen Pelvis With Contrast [[id number]] Collected:  12/30/19 1849     Updated:  12/30/19 1856    Narrative:       CT ABDOMEN PELVIS W CONTRAST- 12/30/2019 6:38 PM CST     HISTORY: Abd pain, fever, abscess suspected       COMPARISON: None.      DOSE LENGTH PRODUCT: 254 mGy cm. Automated exposure control was also  utilized to decrease patient radiation dose.     TECHNIQUE: Following the intravenous administration of contrast, helical  CT tomographic images of the abdomen and pelvis were acquired.  Multiplanar reformatted images were provided for review.      FINDINGS:   LOWER CHEST: A moderate to large RIGHT pleural effusion is present. A  small LEFT pleural effusion is noted. Compressive atelectasis is seen in  the lungs. The RIGHT heart is dilated.      LIVER: No focal liver lesion. The hepatic vasculature is patent.      BILIARY SYSTEM: High density material in the gallbladder wall is likely  due to adenomyomatosis.      PANCREAS: No focal pancreatic lesion.      SPLEEN: Unremarkable.      KIDNEYS: Bilateral kidneys are unremarkable. The ureters are  decompressed and normal in appearance.     ADRENALS: Unremarkable.     RETROPERITONEUM: No mass, lymphadenopathy or hemorrhage.      GI TRACT: There is mild thickening of the distal transverse colon wall.  A few colonic diverticula are present. There is a moderate-sized LEFT  inguinal hernia containing a nonobstructed loop of small bowel. The  appendix is visualized and unremarkable.     OTHER: There is no mesenteric mass,  lymphadenopathy or fluid collection.  Severe atherosclerosis is present. There is approximately 50% stenosis  of the aorta throughout its course due to intraluminal thrombus.  Degenerative changes are seen in the spine. No worrisome bony lesions  are identified.     PELVIS: No mass lesion, fluid collection or significant lymphadenopathy  is seen in the pelvis. The urinary bladder is mildly distended. The  prostate is enlarged.       Impression:       1. Short segment of colitis or diverticulitis in the distal transverse  colon. No perforation or abscess.  2. Bilateral pleural effusions and evidence of RIGHT heart failure.  3. Moderate to severe atherosclerosis in the aorta with atheromatous  plaque causing at least 50% stenosis of the aorta.  4. Prostatomegaly.  5. Nonobstructive LEFT inguinal hernia.        This report was finalized on 12/30/2019 18:53 by Dr. Shorty Lee MD.    XR Chest 1 View [[id number]] Collected:  12/30/19 1819     Updated:  12/30/19 1823    Narrative:       XR CHEST 1 VW- 12/30/2019     HISTORY: Congestion       COMPARISON: 11/14/2019.     FINDINGS:   A moderate-sized RIGHT pleural effusion is again noted. The the LEFT  lung is clear. The cardiac silhouette is unchanged. Pacemaker leads are  again noted.      The osseous structures and surrounding soft tissues demonstrate no acute  abnormality.       Impression:       1. Moderate-sized RIGHT pleural effusion is again seen.  2. No significant interval change since the previous study.  This report was finalized on 12/30/2019 18:19 by Dr. Shorty Lee MD.        Lab Results (last 7 days)     Procedure Component Value Units Date/Time    Basic Metabolic Panel [[id number]]  (Abnormal) Collected:  01/01/20 0618    Specimen:  Blood Updated:  01/01/20 0658     Glucose 96 mg/dL      BUN 12 mg/dL      Creatinine 0.83 mg/dL      Sodium 142 mmol/L      Potassium 3.2 mmol/L      Chloride 105 mmol/L      CO2 26.0 mmol/L      Calcium 8.6 mg/dL      eGFR  Non  Amer 86 mL/min/1.73      BUN/Creatinine Ratio 14.5     Anion Gap 11.0 mmol/L     Narrative:       GFR Normal >60  Chronic Kidney Disease <60  Kidney Failure <15      CBC (No Diff) [[id number]]  (Abnormal) Collected:  01/01/20 0618    Specimen:  Blood Updated:  01/01/20 0641     WBC 8.60 10*3/mm3      RBC 4.78 10*6/mm3      Hemoglobin 14.7 g/dL      Hematocrit 42.6 %      MCV 89.1 fL      MCH 30.8 pg      MCHC 34.5 g/dL      RDW 14.6 %      RDW-SD 47.8 fl      MPV 9.8 fL      Platelets 139 10*3/mm3     Blood Culture - Blood, Arm, Left [[id number]] Collected:  12/31/19 0108    Specimen:  Blood from Arm, Left Updated:  01/01/20 0130     Blood Culture No growth at 24 hours    Blood Culture - Blood, Arm, Left [[id number]] Collected:  12/31/19 0108    Specimen:  Blood from Arm, Left Updated:  01/01/20 0130     Blood Culture No growth at 24 hours    Hemoglobin & Hematocrit, Blood [[id number]]  (Normal) Collected:  12/31/19 1552    Specimen:  Blood Updated:  12/31/19 1607     Hemoglobin 15.5 g/dL      Hematocrit 45.0 %     Hemoglobin & Hematocrit, Blood [[id number]]  (Normal) Collected:  12/31/19 0804    Specimen:  Blood Updated:  12/31/19 0816     Hemoglobin 14.9 g/dL      Hematocrit 43.1 %     Comprehensive Metabolic Panel [[id number]]  (Abnormal) Collected:  12/31/19 0046    Specimen:  Blood Updated:  12/31/19 0124     Glucose 126 mg/dL      BUN 16 mg/dL      Creatinine 0.82 mg/dL      Sodium 142 mmol/L      Potassium 3.9 mmol/L      Chloride 105 mmol/L      CO2 27.0 mmol/L      Calcium 8.8 mg/dL      Total Protein 5.9 g/dL      Albumin 3.20 g/dL      ALT (SGPT) <5 U/L      AST (SGOT) 17 U/L      Alkaline Phosphatase 81 U/L      Total Bilirubin 1.5 mg/dL      eGFR Non African Amer 88 mL/min/1.73      Globulin 2.7 gm/dL      A/G Ratio 1.2 g/dL      BUN/Creatinine Ratio 19.5     Anion Gap 10.0 mmol/L     Narrative:       GFR Normal >60  Chronic Kidney Disease <60  Kidney Failure <15      Lactic Acid, Plasma  [[id number]]  (Normal) Collected:  12/31/19 0046    Specimen:  Blood Updated:  12/31/19 0120     Lactate 1.3 mmol/L     CBC Auto Differential [[id number]]  (Abnormal) Collected:  12/31/19 0046    Specimen:  Blood Updated:  12/31/19 0109     WBC 16.26 10*3/mm3      RBC 5.02 10*6/mm3      Hemoglobin 15.4 g/dL      Hematocrit 45.2 %      MCV 90.0 fL      MCH 30.7 pg      MCHC 34.1 g/dL      RDW 15.0 %      RDW-SD 49.5 fl      MPV 9.5 fL      Platelets 139 10*3/mm3      Neutrophil % 80.8 %      Lymphocyte % 9.7 %      Monocyte % 6.6 %      Eosinophil % 2.0 %      Basophil % 0.5 %      Immature Grans % 0.4 %      Neutrophils, Absolute 13.13 10*3/mm3      Lymphocytes, Absolute 1.57 10*3/mm3      Monocytes, Absolute 1.08 10*3/mm3      Eosinophils, Absolute 0.33 10*3/mm3      Basophils, Absolute 0.08 10*3/mm3      Immature Grans, Absolute 0.07 10*3/mm3      nRBC 0.0 /100 WBC     Magnesium [[id number]]  (Normal) Collected:  12/30/19 1513    Specimen:  Blood Updated:  12/31/19 0051     Magnesium 2.0 mg/dL     Phosphorus [[id number]]  (Abnormal) Collected:  12/30/19 1513    Specimen:  Blood Updated:  12/31/19 0051     Phosphorus 2.3 mg/dL     TSH [[id number]]  (Normal) Collected:  12/30/19 1513    Specimen:  Blood Updated:  12/31/19 0051     TSH 2.990 uIU/mL     T4, Free [[id number]]  (Normal) Collected:  12/30/19 1513    Specimen:  Blood Updated:  12/31/19 0051     Free T4 1.51 ng/dL     Urinalysis, Microscopic Only - Urine, Clean Catch [[id number]]  (Abnormal) Collected:  12/30/19 1823    Specimen:  Urine, Clean Catch Updated:  12/30/19 1921     RBC, UA 6-12 /HPF      WBC, UA 0-2 /HPF      Bacteria, UA None Seen /HPF      Squamous Epithelial Cells, UA 0-2 /HPF      Hyaline Casts, UA None Seen /LPF      Methodology Manual Light Microscopy    Urinalysis With Culture If Indicated - Urine, Clean Catch [[id number]]  (Abnormal) Collected:  12/30/19 1823    Specimen:  Urine, Clean Catch Updated:  12/30/19 1852     Color, UA Dark Yellow      Appearance, UA Clear     pH, UA <=5.0     Specific Gravity, UA 1.025     Glucose, UA Negative     Ketones, UA Negative     Bilirubin, UA Small (1+)     Blood, UA Trace     Protein,  mg/dL (2+)     Leuk Esterase, UA Trace     Nitrite, UA Positive     Urobilinogen, UA 0.2 E.U./dL    Comprehensive Metabolic Panel [[id number]]  (Abnormal) Collected:  12/30/19 1513    Specimen:  Blood Updated:  12/30/19 1608     Glucose 152 mg/dL      BUN 17 mg/dL      Creatinine 0.90 mg/dL      Sodium 141 mmol/L      Potassium 3.6 mmol/L      Chloride 102 mmol/L      CO2 25.0 mmol/L      Calcium 9.1 mg/dL      Total Protein 6.9 g/dL      Albumin 3.90 g/dL      ALT (SGPT) 10 U/L      AST (SGOT) 13 U/L      Alkaline Phosphatase 88 U/L      Total Bilirubin 1.3 mg/dL      eGFR Non African Amer 79 mL/min/1.73      Globulin 3.0 gm/dL      A/G Ratio 1.3 g/dL      BUN/Creatinine Ratio 18.9     Anion Gap 14.0 mmol/L     Narrative:       GFR Normal >60  Chronic Kidney Disease <60  Kidney Failure <15      POC Occult Blood Stool [[id number]]  (Abnormal) Collected:  12/30/19 1605    Specimen:  Stool Updated:  12/30/19 1607     Fecal Occult Blood Positive     Lot Number [id number]     Expiration Date 09/30/2020     DEVELOPER LOT NUMBER 165     DEVELOPER EXPIRATION DATE 09/30/2020     Positive Control Positive     Negative Control Negative    Protime-INR [[id number]]  (Abnormal) Collected:  12/30/19 1513    Specimen:  Blood Updated:  12/30/19 1600     Protime 15.5 Seconds      INR 1.19    aPTT [[id number]]  (Normal) Collected:  12/30/19 1513    Specimen:  Blood Updated:  12/30/19 1600     PTT 33.0 seconds     CBC Auto Differential [[id number]]  (Abnormal) Collected:  12/30/19 1513    Specimen:  Blood Updated:  12/30/19 1554     WBC 16.15 10*3/mm3      RBC 5.62 10*6/mm3      Hemoglobin 17.5 g/dL      Hematocrit 50.9 %      MCV 90.6 fL      MCH 31.1 pg      MCHC 34.4 g/dL      RDW 15.1 %      RDW-SD 49.5 fl      MPV 9.5 fL      Platelets 152 10*3/mm3       "Neutrophil % 84.3 %      Lymphocyte % 6.6 %      Monocyte % 6.7 %      Eosinophil % 1.4 %      Basophil % 0.6 %      Immature Grans % 0.4 %      Neutrophils, Absolute 13.62 10*3/mm3      Lymphocytes, Absolute 1.07 10*3/mm3      Monocytes, Absolute 1.08 10*3/mm3      Eosinophils, Absolute 0.22 10*3/mm3      Basophils, Absolute 0.09 10*3/mm3      Immature Grans, Absolute 0.07 10*3/mm3      nRBC 0.0 /100 WBC         Hospital Course:  The patient was admitted on 12/30 by Dr. Love after presenting to the emergency department with complaints of rectal bleeding for 1 day duration.  He was found to have a leukocytosis on laboratory work-up.  He was found to have a short segment of colitis versus diverticulitis in the distal transverse colon with no perforation or abscess.  He was admitted for close observation and IV antibiotics.  GI was also consulted.     He was started on Invanz at presentation.  He has no other antibiotic allergies.  We will treat him with ciprofloxacin and metronidazole post discharge.     Dr. Washington with GI was consulted.     No plans for colonoscopy at this point.  As needed follow-up as an outpatient.     Aspirin was held.  Hemoglobin stable. Resume aspirin post discharge.      He has a history of paroxysmal atrial fibrillation.  He has history of sick sinus syndrome and has a pacemaker.  He has not been on full dose anticoagulation in the past.     Appropriate home medications were resumed.      SCDs were used for DVT prophylaxis.     He has not experienced any further bleeding.  He feels well.  He would like to go home.  His hemoglobin has remained completely stable.  He will see Dr. Rubio in 1 week.  He can follow-up with GI on an as-needed basis as an outpatient.    Physical Exam on Discharge:  /89 (BP Location: Right arm, Patient Position: Sitting)   Pulse 79   Temp 97.6 °F (36.4 °C) (Oral)   Resp 18   Ht 182.9 cm (72\")   Wt 77.1 kg (170 lb)   SpO2 95%   BMI 23.06 kg/m²  "   Physical Exam  Constitutional: He is oriented to person, place, and time. He appears well-developed and well-nourished. Up in the chair.  No distress.  His wife is present with him. Discussed with his nurse, Fern.   Head: Normocephalic and atraumatic.   Eyes: Pupils are equal, round, and reactive to light. Conjunctivae and EOM are normal.   Neck: Neck supple. No JVD present.   Cardiovascular: Normal rate, regular rhythm, normal heart sounds and intact distal pulses. Exam reveals no gallop and no friction rub.   No murmur heard.  Pulmonary/Chest: Effort normal and breath sounds normal. No respiratory distress. He has no wheezes. He has no rales. He exhibits no tenderness.   Abdominal: Soft. Bowel sounds are normal. He exhibits no distension. There is no tenderness. There is no rebound and no guarding.   Musculoskeletal: Normal range of motion. He exhibits edema. He exhibits no tenderness or deformity.   Neurological: He is alert and oriented to person, place, and time. He displays normal reflexes. No cranial nerve deficit. He exhibits normal muscle tone.   Skin: Skin is warm and dry. No rash noted.   Psychiatric: He has a normal mood and affect. His behavior is normal. Judgment and thought content normal.      Condition on Discharge: Good.     Discharge Disposition:  Home or Self Care    Discharge Medications:     Discharge Medications      New Medications      Instructions Start Date   ciprofloxacin 500 MG tablet  Commonly known as:  CIPRO   500 mg, Oral, 2 Times Daily      metroNIDAZOLE 500 MG tablet  Commonly known as:  FLAGYL   500 mg, Oral, 3 Times Daily         Continue These Medications      Instructions Start Date   amLODIPine 5 MG tablet  Commonly known as:  NORVASC   5 mg, Oral, Daily      aspirin 325 MG tablet   325 mg, Oral, Daily      cloNIDine 0.1 MG tablet  Commonly known as:  CATAPRES   0.1 mg, Oral, 3 Times Daily PRN      furosemide 20 MG tablet  Commonly known as:  LASIX   20 mg, Oral, Daily       ipratropium-albuterol 0.5-2.5 mg/3 ml nebulizer  Commonly known as:  DUO-NEB   3 mL, Nebulization, 4 Times Daily - RT      levothyroxine 75 MCG tablet  Commonly known as:  SYNTHROID, LEVOTHROID   75 mcg, Oral, Daily      meclizine 25 MG tablet  Commonly known as:  ANTIVERT   25 mg, Oral, 2 Times Daily PRN      metoprolol succinate XL 50 MG 24 hr tablet  Commonly known as:  TOPROL-XL   50 mg, Oral, 2 Times Daily      pantoprazole 40 MG EC tablet  Commonly known as:  PROTONIX   40 mg, Oral, 2 Times Daily      VIAGRA 100 MG tablet  Generic drug:  sildenafil   50 mg, Oral, Daily PRN           Discharge Diet:   Diet Instructions     Advance Diet As Tolerated          Activity at Discharge:   Activity Instructions     Activity as Tolerated          Follow-up Appointments:   Dr. Nick Rubio in 1 week.     Test Results Pending at Discharge: None.     Mj Robin DO  01/01/20  12:03 PM    Time: 25 minutes.           Electronically signed by Mj Robin DO at 01/01/20 [id number]

## 2020-01-01 NOTE — DISCHARGE SUMMARY
[facility] Medicine Services  DISCHARGE SUMMARY       Date of Admission: 12/30/2019  Date of Discharge:  1/1/2020  Primary Care Physician: Pete Rubio MD    Presenting Problem/History of Present Illness:  Rectal bleeding.     Final Discharge Diagnoses:  Active Hospital Problems    Diagnosis   • **Colitis   • Lower GI bleed   • Atrial fibrillation (CMS/HCC)   • Sinoatrial node dysfunction (CMS/HCC)   • Chronic diastolic heart failure (CMS/HCC)   • Pleural effusion   • HTN (hypertension), benign     Consults: Dr. Washington with GI.     Procedures Performed: None.     Pertinent Test Results:   Imaging Results (Last 7 Days)     Procedure Component Value Units Date/Time    CT Abdomen Pelvis With Contrast [[id number]] Collected:  12/30/19 1849     Updated:  12/30/19 1856    Narrative:       CT ABDOMEN PELVIS W CONTRAST- 12/30/2019 6:38 PM CST     HISTORY: Abd pain, fever, abscess suspected       COMPARISON: None.      DOSE LENGTH PRODUCT: 254 mGy cm. Automated exposure control was also  utilized to decrease patient radiation dose.     TECHNIQUE: Following the intravenous administration of contrast, helical  CT tomographic images of the abdomen and pelvis were acquired.  Multiplanar reformatted images were provided for review.      FINDINGS:   LOWER CHEST: A moderate to large RIGHT pleural effusion is present. A  small LEFT pleural effusion is noted. Compressive atelectasis is seen in  the lungs. The RIGHT heart is dilated.      LIVER: No focal liver lesion. The hepatic vasculature is patent.      BILIARY SYSTEM: High density material in the gallbladder wall is likely  due to adenomyomatosis.      PANCREAS: No focal pancreatic lesion.      SPLEEN: Unremarkable.      KIDNEYS: Bilateral kidneys are unremarkable. The ureters are  decompressed and normal in appearance.     ADRENALS: Unremarkable.     RETROPERITONEUM: No mass, lymphadenopathy or hemorrhage.      GI TRACT: There is mild  thickening of the distal transverse colon wall.  A few colonic diverticula are present. There is a moderate-sized LEFT  inguinal hernia containing a nonobstructed loop of small bowel. The  appendix is visualized and unremarkable.     OTHER: There is no mesenteric mass, lymphadenopathy or fluid collection.  Severe atherosclerosis is present. There is approximately 50% stenosis  of the aorta throughout its course due to intraluminal thrombus.  Degenerative changes are seen in the spine. No worrisome bony lesions  are identified.     PELVIS: No mass lesion, fluid collection or significant lymphadenopathy  is seen in the pelvis. The urinary bladder is mildly distended. The  prostate is enlarged.       Impression:       1. Short segment of colitis or diverticulitis in the distal transverse  colon. No perforation or abscess.  2. Bilateral pleural effusions and evidence of RIGHT heart failure.  3. Moderate to severe atherosclerosis in the aorta with atheromatous  plaque causing at least 50% stenosis of the aorta.  4. Prostatomegaly.  5. Nonobstructive LEFT inguinal hernia.        This report was finalized on 12/30/2019 18:53 by Dr. Shorty Lee MD.    XR Chest 1 View [[id number]] Collected:  12/30/19 1819     Updated:  12/30/19 1823    Narrative:       XR CHEST 1 VW- 12/30/2019     HISTORY: Congestion       COMPARISON: 11/14/2019.     FINDINGS:   A moderate-sized RIGHT pleural effusion is again noted. The the LEFT  lung is clear. The cardiac silhouette is unchanged. Pacemaker leads are  again noted.      The osseous structures and surrounding soft tissues demonstrate no acute  abnormality.       Impression:       1. Moderate-sized RIGHT pleural effusion is again seen.  2. No significant interval change since the previous study.  This report was finalized on 12/30/2019 18:19 by Dr. Shorty Lee MD.        Lab Results (last 7 days)     Procedure Component Value Units Date/Time    Basic Metabolic Panel [[id number]]   (Abnormal) Collected:  01/01/20 0618    Specimen:  Blood Updated:  01/01/20 0658     Glucose 96 mg/dL      BUN 12 mg/dL      Creatinine 0.83 mg/dL      Sodium 142 mmol/L      Potassium 3.2 mmol/L      Chloride 105 mmol/L      CO2 26.0 mmol/L      Calcium 8.6 mg/dL      eGFR Non African Amer 86 mL/min/1.73      BUN/Creatinine Ratio 14.5     Anion Gap 11.0 mmol/L     Narrative:       GFR Normal >60  Chronic Kidney Disease <60  Kidney Failure <15      CBC (No Diff) [[id number]]  (Abnormal) Collected:  01/01/20 0618    Specimen:  Blood Updated:  01/01/20 0641     WBC 8.60 10*3/mm3      RBC 4.78 10*6/mm3      Hemoglobin 14.7 g/dL      Hematocrit 42.6 %      MCV 89.1 fL      MCH 30.8 pg      MCHC 34.5 g/dL      RDW 14.6 %      RDW-SD 47.8 fl      MPV 9.8 fL      Platelets 139 10*3/mm3     Blood Culture - Blood, Arm, Left [[id number]] Collected:  12/31/19 0108    Specimen:  Blood from Arm, Left Updated:  01/01/20 0130     Blood Culture No growth at 24 hours    Blood Culture - Blood, Arm, Left [[id number]] Collected:  12/31/19 0108    Specimen:  Blood from Arm, Left Updated:  01/01/20 0130     Blood Culture No growth at 24 hours    Hemoglobin & Hematocrit, Blood [[id number]]  (Normal) Collected:  12/31/19 1552    Specimen:  Blood Updated:  12/31/19 1607     Hemoglobin 15.5 g/dL      Hematocrit 45.0 %     Hemoglobin & Hematocrit, Blood [[id number]]  (Normal) Collected:  12/31/19 0804    Specimen:  Blood Updated:  12/31/19 0816     Hemoglobin 14.9 g/dL      Hematocrit 43.1 %     Comprehensive Metabolic Panel [[id number]]  (Abnormal) Collected:  12/31/19 0046    Specimen:  Blood Updated:  12/31/19 0124     Glucose 126 mg/dL      BUN 16 mg/dL      Creatinine 0.82 mg/dL      Sodium 142 mmol/L      Potassium 3.9 mmol/L      Chloride 105 mmol/L      CO2 27.0 mmol/L      Calcium 8.8 mg/dL      Total Protein 5.9 g/dL      Albumin 3.20 g/dL      ALT (SGPT) <5 U/L      AST (SGOT) 17 U/L      Alkaline Phosphatase 81 U/L      Total Bilirubin  1.5 mg/dL      eGFR Non African Amer 88 mL/min/1.73      Globulin 2.7 gm/dL      A/G Ratio 1.2 g/dL      BUN/Creatinine Ratio 19.5     Anion Gap 10.0 mmol/L     Narrative:       GFR Normal >60  Chronic Kidney Disease <60  Kidney Failure <15      Lactic Acid, Plasma [[id number]]  (Normal) Collected:  12/31/19 0046    Specimen:  Blood Updated:  12/31/19 0120     Lactate 1.3 mmol/L     CBC Auto Differential [[id number]]  (Abnormal) Collected:  12/31/19 0046    Specimen:  Blood Updated:  12/31/19 0109     WBC 16.26 10*3/mm3      RBC 5.02 10*6/mm3      Hemoglobin 15.4 g/dL      Hematocrit 45.2 %      MCV 90.0 fL      MCH 30.7 pg      MCHC 34.1 g/dL      RDW 15.0 %      RDW-SD 49.5 fl      MPV 9.5 fL      Platelets 139 10*3/mm3      Neutrophil % 80.8 %      Lymphocyte % 9.7 %      Monocyte % 6.6 %      Eosinophil % 2.0 %      Basophil % 0.5 %      Immature Grans % 0.4 %      Neutrophils, Absolute 13.13 10*3/mm3      Lymphocytes, Absolute 1.57 10*3/mm3      Monocytes, Absolute 1.08 10*3/mm3      Eosinophils, Absolute 0.33 10*3/mm3      Basophils, Absolute 0.08 10*3/mm3      Immature Grans, Absolute 0.07 10*3/mm3      nRBC 0.0 /100 WBC     Magnesium [[id number]]  (Normal) Collected:  12/30/19 1513    Specimen:  Blood Updated:  12/31/19 0051     Magnesium 2.0 mg/dL     Phosphorus [[id number]]  (Abnormal) Collected:  12/30/19 1513    Specimen:  Blood Updated:  12/31/19 0051     Phosphorus 2.3 mg/dL     TSH [[id number]]  (Normal) Collected:  12/30/19 1513    Specimen:  Blood Updated:  12/31/19 0051     TSH 2.990 uIU/mL     T4, Free [[id number]]  (Normal) Collected:  12/30/19 1513    Specimen:  Blood Updated:  12/31/19 0051     Free T4 1.51 ng/dL     Urinalysis, Microscopic Only - Urine, Clean Catch [[id number]]  (Abnormal) Collected:  12/30/19 1823    Specimen:  Urine, Clean Catch Updated:  12/30/19 1921     RBC, UA 6-12 /HPF      WBC, UA 0-2 /HPF      Bacteria, UA None Seen /HPF      Squamous Epithelial Cells, UA 0-2 /HPF       Hyaline Casts, UA None Seen /LPF      Methodology Manual Light Microscopy    Urinalysis With Culture If Indicated - Urine, Clean Catch [[id number]]  (Abnormal) Collected:  12/30/19 1823    Specimen:  Urine, Clean Catch Updated:  12/30/19 1852     Color, UA Dark Yellow     Appearance, UA Clear     pH, UA <=5.0     Specific Gravity, UA 1.025     Glucose, UA Negative     Ketones, UA Negative     Bilirubin, UA Small (1+)     Blood, UA Trace     Protein,  mg/dL (2+)     Leuk Esterase, UA Trace     Nitrite, UA Positive     Urobilinogen, UA 0.2 E.U./dL    Comprehensive Metabolic Panel [[id number]]  (Abnormal) Collected:  12/30/19 1513    Specimen:  Blood Updated:  12/30/19 1608     Glucose 152 mg/dL      BUN 17 mg/dL      Creatinine 0.90 mg/dL      Sodium 141 mmol/L      Potassium 3.6 mmol/L      Chloride 102 mmol/L      CO2 25.0 mmol/L      Calcium 9.1 mg/dL      Total Protein 6.9 g/dL      Albumin 3.90 g/dL      ALT (SGPT) 10 U/L      AST (SGOT) 13 U/L      Alkaline Phosphatase 88 U/L      Total Bilirubin 1.3 mg/dL      eGFR Non African Amer 79 mL/min/1.73      Globulin 3.0 gm/dL      A/G Ratio 1.3 g/dL      BUN/Creatinine Ratio 18.9     Anion Gap 14.0 mmol/L     Narrative:       GFR Normal >60  Chronic Kidney Disease <60  Kidney Failure <15      POC Occult Blood Stool [[id number]]  (Abnormal) Collected:  12/30/19 1605    Specimen:  Stool Updated:  12/30/19 1607     Fecal Occult Blood Positive     Lot Number [id number]     Expiration Date 09/30/2020     DEVELOPER LOT NUMBER [id number]     DEVELOPER EXPIRATION DATE 09/30/2020     Positive Control Positive     Negative Control Negative    Protime-INR [[id number]]  (Abnormal) Collected:  12/30/19 1513    Specimen:  Blood Updated:  12/30/19 1600     Protime 15.5 Seconds      INR 1.19    aPTT [[id number]]  (Normal) Collected:  12/30/19 1513    Specimen:  Blood Updated:  12/30/19 1600     PTT 33.0 seconds     CBC Auto Differential [[id number]]  (Abnormal) Collected:  12/30/19 1513     Specimen:  Blood Updated:  12/30/19 1554     WBC 16.15 10*3/mm3      RBC 5.62 10*6/mm3      Hemoglobin 17.5 g/dL      Hematocrit 50.9 %      MCV 90.6 fL      MCH 31.1 pg      MCHC 34.4 g/dL      RDW 15.1 %      RDW-SD 49.5 fl      MPV 9.5 fL      Platelets 152 10*3/mm3      Neutrophil % 84.3 %      Lymphocyte % 6.6 %      Monocyte % 6.7 %      Eosinophil % 1.4 %      Basophil % 0.6 %      Immature Grans % 0.4 %      Neutrophils, Absolute 13.62 10*3/mm3      Lymphocytes, Absolute 1.07 10*3/mm3      Monocytes, Absolute 1.08 10*3/mm3      Eosinophils, Absolute 0.22 10*3/mm3      Basophils, Absolute 0.09 10*3/mm3      Immature Grans, Absolute 0.07 10*3/mm3      nRBC 0.0 /100 WBC         Hospital Course:  The patient was admitted on 12/30 by Dr. Love after presenting to the emergency department with complaints of rectal bleeding for 1 day duration.  He was found to have a leukocytosis on laboratory work-up.  He was found to have a short segment of colitis versus diverticulitis in the distal transverse colon with no perforation or abscess.  He was admitted for close observation and IV antibiotics.  GI was also consulted.     He was started on Invanz at presentation.  He has no other antibiotic allergies.  We will treat him with ciprofloxacin and metronidazole post discharge.     Dr. Washington with GI was consulted.    No plans for colonoscopy at this point.  As needed follow-up as an outpatient.     Aspirin was held.  Hemoglobin stable. Resume aspirin post discharge.      He has a history of paroxysmal atrial fibrillation.  He has history of sick sinus syndrome and has a pacemaker.  He has not been on full dose anticoagulation in the past.     Appropriate home medications were resumed.      SCDs were used for DVT prophylaxis.     He has not experienced any further bleeding.  He feels well.  He would like to go home.  His hemoglobin has remained completely stable.  He will see Dr. Rubio in 1 week.  He can follow-up with GI  "on an as-needed basis as an outpatient.    Physical Exam on Discharge:  /89 (BP Location: Right arm, Patient Position: Sitting)   Pulse 79   Temp 97.6 °F (36.4 °C) (Oral)   Resp 18   Ht 182.9 cm (72\")   Wt 77.1 kg (170 lb)   SpO2 95%   BMI 23.06 kg/m²   Physical Exam  Constitutional: He is oriented to person, place, and time. He appears well-developed and well-nourished. Up in the chair.  No distress.  His wife is present with him. Discussed with his nurse, Fern.   Head: Normocephalic and atraumatic.   Eyes: Pupils are equal, round, and reactive to light. Conjunctivae and EOM are normal.   Neck: Neck supple. No JVD present.   Cardiovascular: Normal rate, regular rhythm, normal heart sounds and intact distal pulses. Exam reveals no gallop and no friction rub.   No murmur heard.  Pulmonary/Chest: Effort normal and breath sounds normal. No respiratory distress. He has no wheezes. He has no rales. He exhibits no tenderness.   Abdominal: Soft. Bowel sounds are normal. He exhibits no distension. There is no tenderness. There is no rebound and no guarding.   Musculoskeletal: Normal range of motion. He exhibits edema. He exhibits no tenderness or deformity.   Neurological: He is alert and oriented to person, place, and time. He displays normal reflexes. No cranial nerve deficit. He exhibits normal muscle tone.   Skin: Skin is warm and dry. No rash noted.   Psychiatric: He has a normal mood and affect. His behavior is normal. Judgment and thought content normal.     Condition on Discharge: Good.     Discharge Disposition:  Home or Self Care    Discharge Medications:     Discharge Medications      New Medications      Instructions Start Date   ciprofloxacin 500 MG tablet  Commonly known as:  CIPRO   500 mg, Oral, 2 Times Daily      metroNIDAZOLE 500 MG tablet  Commonly known as:  FLAGYL   500 mg, Oral, 3 Times Daily         Continue These Medications      Instructions Start Date   amLODIPine 5 MG " tablet  Commonly known as:  NORVASC   5 mg, Oral, Daily      aspirin 325 MG tablet   325 mg, Oral, Daily      cloNIDine 0.1 MG tablet  Commonly known as:  CATAPRES   0.1 mg, Oral, 3 Times Daily PRN      furosemide 20 MG tablet  Commonly known as:  LASIX   20 mg, Oral, Daily      ipratropium-albuterol 0.5-2.5 mg/3 ml nebulizer  Commonly known as:  DUO-NEB   3 mL, Nebulization, 4 Times Daily - RT      levothyroxine 75 MCG tablet  Commonly known as:  SYNTHROID, LEVOTHROID   75 mcg, Oral, Daily      meclizine 25 MG tablet  Commonly known as:  ANTIVERT   25 mg, Oral, 2 Times Daily PRN      metoprolol succinate XL 50 MG 24 hr tablet  Commonly known as:  TOPROL-XL   50 mg, Oral, 2 Times Daily      pantoprazole 40 MG EC tablet  Commonly known as:  PROTONIX   40 mg, Oral, 2 Times Daily      VIAGRA 100 MG tablet  Generic drug:  sildenafil   50 mg, Oral, Daily PRN           Discharge Diet:   Diet Instructions     Advance Diet As Tolerated          Activity at Discharge:   Activity Instructions     Activity as Tolerated          Follow-up Appointments:   Dr. Nick Rubio in 1 week.     Test Results Pending at Discharge: None.     Mj Robin DO  01/01/20  12:03 PM    Time: 25 minutes.

## 2020-01-01 NOTE — PLAN OF CARE
Problem: Patient Care Overview  Goal: Plan of Care Review  Outcome: Ongoing (interventions implemented as appropriate)  Flowsheets  Taken 12/31/2019 0105 by Carmina Fitzgerald RN  Progress: no change  Taken 12/31/2019 2130 by Amara Leavitt RNA  Plan of Care Reviewed With: patient  Taken 1/1/2020 0457 by Amara Leavitt RNA  Outcome Summary: Pt denies pain this shift. IVF and IV abx. BMs are without new blood, some old blood but very small amount. Tele in place; tachycardic with PVCs. VSS. Will continue to monitor. Wife at bedside; very helpful and attentive to patient. Pt removed IV per self during the night. Confused at times. Pt is hopeful to be discharged today.

## 2020-01-01 NOTE — PROGRESS NOTES
[facility]astroenterology  Inpatient Progress Note  Today's date:  01/01/20    Broderick Gomez  8/15/1926       Reason for Follow Up: GI bleed    Subjective: The patient is sitting up to chair with wife at bedside this morning.  He tells me that he is feeling much better.  He states that he ate well.  He and his wife report that he did have loose stool but has not seen any more blood or blood clots.    The patient denies any nausea, vomiting, epigastric pain, dysphagia, pyrosis or hematemesis.  The patient denies any fever or chills.  Denies any melena or hematochezia.  Denies any unintentional weight loss or loss of appetite.      No Known Allergies    Current Facility-Administered Medications:   •  acetaminophen (TYLENOL) tablet 650 mg, 650 mg, Oral, Q6H PRN, Mj Robin DO  •  amLODIPine (NORVASC) tablet 5 mg, 5 mg, Oral, Daily, Stanislav Love MD, 5 mg at 01/01/20 0911  •  cloNIDine (CATAPRES) tablet 0.1 mg, 0.1 mg, Oral, TID PRN, Stanislav Love MD  •  ertapenem (INVanz) 1 g/100 mL 0.9% NS VTB (mbp), 1 g, Intravenous, Q24H, Stanislav Love MD, 1 g at 12/31/19 2139  •  furosemide (LASIX) tablet 20 mg, 20 mg, Oral, Daily, Stanislav Love MD, 20 mg at 01/01/20 0911  •  ipratropium-albuterol (DUO-NEB) nebulizer solution 3 mL, 3 mL, Nebulization, 4x Daily - RT, Stanislav Love MD, 3 mL at 01/01/20 0541  •  levothyroxine (SYNTHROID, LEVOTHROID) tablet 75 mcg, 75 mcg, Oral, Q AM, Stanislav Love MD, 75 mcg at 01/01/20 0641  •  meclizine (ANTIVERT) tablet 25 mg, 25 mg, Oral, BID PRN, Stanislav Love MD  •  metoprolol succinate XL (TOPROL-XL) 24 hr tablet 50 mg, 50 mg, Oral, BID, Stanislav Love MD, 50 mg at 01/01/20 0911  •  ondansetron (ZOFRAN) injection 4 mg, 4 mg, Intravenous, Q6H PRN, Mj Robin, DO  •  pantoprazole (PROTONIX) EC tablet 40 mg, 40 mg, Oral, BID, Stanislav Love MD, 40 mg at 01/01/20 0911  •  potassium chloride (MICRO-K) CR capsule 40 mEq, 40 mEq,  Oral, Once, Mj Robin, DO  •  sodium chloride 0.9 % flush 10 mL, 10 mL, Intravenous, PRN, Stanislav Love MD  •  sodium chloride 0.9 % flush 10 mL, 10 mL, Intravenous, Q12H, Stanislav Love MD, 10 mL at 12/31/19 2140  •  sodium chloride 0.9 % flush 10 mL, 10 mL, Intravenous, PRN, Stanislav Love MD    Review of Systems:   Review of Systems   Constitutional: Negative for fever and unexpected weight change.   HENT: Negative for hearing loss.    Eyes: Negative for visual disturbance.   Respiratory: Negative for cough.    Cardiovascular: Negative for chest pain.   Gastrointestinal:        See HPI   Endocrine: Negative for cold intolerance and heat intolerance.   Genitourinary: Negative for dysuria.   Musculoskeletal: Negative for arthralgias.   Skin: Negative for rash.   Neurological: Negative for seizures.   Psychiatric/Behavioral: Negative for hallucinations.        Vital Signs:  Temp:  [97.6 °F (36.4 °C)-97.9 °F (36.6 °C)] 97.6 °F (36.4 °C)  Heart Rate:  [] 79  Resp:  [16-20] 18  BP: (136-154)/(63-89) 139/89  Body mass index is 23.06 kg/m².     Intake/Output Summary (Last 24 hours) at 1/1/2020 0958  Last data filed at 1/1/2020 0844  Gross per 24 hour   Intake 1082 ml   Output 1700 ml   Net -618 ml     I/O this shift:  In: 360 [P.O.:360]  Out: 100 [Urine:100]  Physical Exam:  Physical Exam   Constitutional: He appears well-developed.   Cardiovascular: Normal rate and regular rhythm.   Pulmonary/Chest: Effort normal.   Abdominal: Soft. Bowel sounds are normal.   Neurological: He is alert.   Psychiatric: He has a normal mood and affect.        Results Review:   I have reviewed all of the patient's current test results    Results from last 7 days   Lab Units 01/01/20  0618 12/31/19  1552 12/31/19  0804 12/31/19  0046 12/30/19  1513   WBC 10*3/mm3 8.60  --   --  16.26* 16.15*   HEMOGLOBIN g/dL 14.7 15.5 14.9 15.4 17.5   HEMATOCRIT % 42.6 45.0 43.1 45.2 50.9   PLATELETS 10*3/mm3 139*  --   --  139* 152        Results from last 7 days   Lab Units 01/01/20  0618 12/31/19  0046 12/30/19  1513   SODIUM mmol/L 142 142 141   POTASSIUM mmol/L 3.2* 3.9 3.6   CHLORIDE mmol/L 105 105 102   CO2 mmol/L 26.0 27.0 25.0   BUN mg/dL 12 16 17   CREATININE mg/dL 0.83 0.82 0.90   CALCIUM mg/dL 8.6 8.8 9.1   BILIRUBIN mg/dL  --  1.5* 1.3*   ALK PHOS U/L  --  81 88   ALT (SGPT) U/L  --  <5 10   AST (SGOT) U/L  --  17 13   GLUCOSE mg/dL 96 126* 152*       Results from last 7 days   Lab Units 12/30/19  1513   INR  1.19*       Lab Results   Lab Value Date/Time    LIPASE 104 04/28/2016 2140       Radiology Review:  Imaging Results (Last 24 Hours)     ** No results found for the last 24 hours. **          Impression/Plan:  Patient Active Problem List   Diagnosis Code   • HTN (hypertension), benign I10   • Esophageal dysphagia R13.10   • Colitis K52.9   • Lower GI bleed K92.2   • Atrial fibrillation (CMS/HCC) I48.91   • Sinoatrial node dysfunction (CMS/HCC) I49.5   • Pleural effusion J90   • Chronic diastolic heart failure (CMS/HCC) I50.32     GI bleed/diverticulitis  Agree with antibiotics for diverticulitis.  No plans for colonoscopy in the setting.  Advance diet as tolerated.  Home when able to take po antibiotics.  GI signing off.    DEVAN Alvarado  01/01/20  9:58 AM     Shanna Washington MD  [facility]  01/01/20  10:23 AM

## 2020-01-02 ENCOUNTER — READMISSION MANAGEMENT (OUTPATIENT)
Dept: CALL CENTER | Facility: HOSPITAL | Age: 85
End: 2020-01-02

## 2020-01-02 NOTE — OUTREACH NOTE
Prep Survey      Responses   Facility patient discharged from?  [place]   Is patient eligible?  No   What are the reasons patient is not eligible?  Other [prefers not to participate]   Does the patient have one of the following disease processes/diagnoses(primary or secondary)?  CHF   Prep survey completed?  Yes          Elizabeth Remy RN

## 2020-01-05 LAB
BACTERIA SPEC AEROBE CULT: NORMAL
BACTERIA SPEC AEROBE CULT: NORMAL

## 2020-03-23 NOTE — TELEPHONE ENCOUNTER
I called wife and spoke with her, patient symptoms are wheezing, cough, shortness of breathe, no fever. Patient is doing breathing treatments. Requesting medication called in and refill of duo-neb.

## 2020-03-23 NOTE — TELEPHONE ENCOUNTER
Pts wife called in wanting to know if medication could be called in for Pt. She was wanting an antibiotic or steroid pack.    Pt Contact: [phone]

## 2020-07-13 PROBLEM — J44.9 COPD WITH HYPOXIA (HCC): Status: ACTIVE | Noted: 2020-01-01

## 2020-07-13 PROBLEM — R09.02 HYPOXIA: Status: ACTIVE | Noted: 2020-01-01

## 2020-07-13 PROBLEM — R09.02 COPD WITH HYPOXIA (HCC): Status: ACTIVE | Noted: 2020-01-01

## 2020-07-13 NOTE — PROGRESS NOTES
Subjective   Broderick Gomez is a 89 y.o. male.   Chief Complaint   Patient presents with   • Hypertension     6 MONTH FOLLOW UP   • Atrial Fibrillation     6 MONTH FOLLOW UP   • Insomnia     NOT SLEEPING WELL OVER THE LAST 2 MONTHS ON AND OFF    • Shortness of Breath     MORE SO THE LAST 2 MONTHS        Patient complains of not sleeping well.  Actually is here with his wife and she tells us that he is tried melatonin and Tylenol PM both of which seem to have adverse effects.  She also tells me that when he is walking about he gets short of breath and his pulse ox would drop to about 84%.       The following portions of the patient's history were reviewed and updated as appropriate: allergies, current medications, past family history, past medical history, past social history, past surgical history and problem list.    Review of Systems   Unable to perform ROS: Dementia   Constitutional: Negative for activity change, appetite change, fatigue, fever, unexpected weight gain and unexpected weight loss.   HENT: Negative for swollen glands, trouble swallowing and voice change.    Eyes: Negative for blurred vision and visual disturbance.   Respiratory: Positive for shortness of breath. Negative for cough.    Cardiovascular: Negative for chest pain, palpitations and leg swelling.   Gastrointestinal: Negative for abdominal pain, constipation, diarrhea, nausea, vomiting and indigestion.   Endocrine: Negative for cold intolerance, heat intolerance, polydipsia and polyphagia.   Genitourinary: Negative for dysuria and frequency.   Musculoskeletal: Negative for arthralgias, back pain, joint swelling and neck pain.   Skin: Negative for color change, rash and skin lesions.   Neurological: Negative for dizziness, weakness, headache, memory problem and confusion.   Hematological: Does not bruise/bleed easily.   Psychiatric/Behavioral: Negative for agitation, hallucinations and suicidal ideas. The patient is not nervous/anxious.         Objective   Past Medical History:   Diagnosis Date   • Acute right lumbar radiculopathy    • Arthritis    • Ataxia    • Atrial fibrillation (CMS/HCC)    • Bilateral edema of lower extremity    • BPPV (benign paroxysmal positional vertigo), bilateral    • Carotid occlusion, bilateral    • Cerebral microvascular disease    • CVA (cerebral vascular accident) (CMS/HCC)    • Elevated PSA    • GERD (gastroesophageal reflux disease)    • Hypercholesteremia    • Hypertension    • Inguinal hernia    • Rhinophyma    • Sinoatrial node dysfunction (CMS/HCC)    • Skin cancer    • Thrombosis of thoracic aorta (CMS/HCC)    • Total bilirubin, elevated    • TSH elevation       Past Surgical History:   Procedure Laterality Date   • CATARACT EXTRACTION, BILATERAL     • COLONOSCOPY  05/16/2012    adenomatous polyp @ cecum, hepatic flexure, 40cm, and 35cm, multiple diverticula in sigmoid and descending colon, recall 3 years, Dr. Del Castillo   • ENDOSCOPY N/A 12/13/2018    Duodenal erosions without bleeding, LA Grade D esophagitis negative for Intestinal Metaplaaia   • EYE SURGERY      eyelid replacement   • HEMORRHOIDECTOMY     • INGUINAL HERNIA REPAIR Right 2008   • KNEE SURGERY Right    • ROTATOR CUFF REPAIR Right 2000   • SKIN CANCER EXCISION          Current Outpatient Medications:   •  amLODIPine (NORVASC) 5 MG tablet, Take 5 mg by mouth Daily., Disp: , Rfl:   •  aspirin 325 MG tablet, Take 325 mg by mouth Daily., Disp: , Rfl:   •  CloNIDine (CATAPRES) 0.1 MG tablet, Take 0.1 mg by mouth 3 (Three) Times a Day As Needed for High Blood Pressure., Disp: , Rfl:   •  furosemide (LASIX) 20 MG tablet, Take 20 mg by mouth Daily., Disp: , Rfl:   •  ipratropium-albuterol (DUO-NEB) 0.5-2.5 mg/3 ml nebulizer, Take 3 mL by nebulization 4 (Four) Times a Day., Disp: 360 mL, Rfl: 2  •  levothyroxine (SYNTHROID, LEVOTHROID) 75 MCG tablet, TAKE 1 TABLET BY MOUTH EVERY DAY, Disp: 90 tablet, Rfl: 3  •  meclizine (ANTIVERT) 25 MG tablet, Take 25 mg  by mouth 2 (Two) Times a Day As Needed for dizziness., Disp: , Rfl:   •  metoprolol succinate XL (TOPROL-XL) 50 MG 24 hr tablet, Take 50 mg by mouth 2 (Two) Times a Day., Disp: , Rfl:   •  pantoprazole (PROTONIX) 40 MG EC tablet, Take 1 tablet by mouth 2 (Two) Times a Day., Disp: 180 tablet, Rfl: 3  •  sildenafil (VIAGRA) 100 MG tablet, Take 50 mg by mouth Daily As Needed for erectile dysfunction., Disp: , Rfl:   •  tamsulosin (FLOMAX) 0.4 MG capsule 24 hr capsule, TAKE 1 CAPSULE BY MOUTH EVERYDAY AT BEDTIME, Disp: 90 capsule, Rfl: 1     Vitals:    07/13/20 1001   BP: 130/80   Pulse: 77   Temp: 97.3 °F (36.3 °C)   SpO2: 91%         07/13/20  1001   Weight: 81.8 kg (180 lb 4.8 oz)     Patient's Body mass index is 24.45 kg/m². BMI is within normal parameters. No follow-up required..      Physical Exam   Constitutional: He is oriented to person, place, and time. He appears well-developed and well-nourished.   HENT:   Head: Normocephalic and atraumatic.   Right Ear: External ear normal.   Left Ear: External ear normal.   Nose: Nose normal.   Mouth/Throat: Oropharynx is clear and moist.   Eyes: Pupils are equal, round, and reactive to light. Conjunctivae and EOM are normal.   Neck: Normal range of motion. Neck supple. No thyromegaly present.   Cardiovascular: Normal rate, regular rhythm, normal heart sounds and intact distal pulses.   Pulmonary/Chest: Effort normal and breath sounds normal.   Abdominal: Soft. Bowel sounds are normal.   Lymphadenopathy:     He has no cervical adenopathy.   Neurological: He is alert and oriented to person, place, and time.   Skin: Skin is warm and dry.   Psychiatric: He has a normal mood and affect. His behavior is normal. Thought content normal.   Nursing note and vitals reviewed.            Assessment/Plan   Diagnoses and all orders for this visit:    1. Hypoxia (Primary)  -     Cancel: Overnight Sleep Oximetry Study; Future  -     Oxygen Therapy    2. COPD with hypoxia (CMS/HCC)    3.  HTN (hypertension), benign      Had patient walk in the hallway and he desatted to about 86% with minimal exertion I do not see anything else going on today I am going to go ahead and order home O2 they can use the O2 as needed during the day and certainly with exercise but also sleeping at night.  Hopefully this will improve some of the insomnia that there experiencing I want to stay away from sedatives due to his COPD

## 2020-07-15 NOTE — TELEPHONE ENCOUNTER
Luma (pt's wife) called to check status on Oxygen. Caller stated she hasn't heard anything. Please call [phone].

## 2020-07-15 NOTE — TELEPHONE ENCOUNTER
Called [facility] and they are still waiting on office note, which I was not aware of.  Faxed office note to [phone] and Stefanie with [facility] indicated they should be able to get him set up today.  Called wife and explained the hold up and apologized, and advised that they should get him set up today.

## 2020-07-18 NOTE — TELEPHONE ENCOUNTER
"Caller states that her  was started on O2 this week for COPD and he has developed wheezing.  He is having to take his duo nebs more frequently to maintain his Spo2 above 90.  Spo2 down to 83% when wheezing, also has mild cough.  Recent pneumonia.  DEVAN Camarillo contacted.  Order for prescriptions received.  Spoke with Mohan Adams at [facility].  Cipro 750mg po bid for 7 days and medrol dose pack with no refills, read back.  Caller notified that prescriptions have been called in.    Reason for Disposition  • [1] Request for URGENT new prescription or refill of \"essential\" medication (i.e., likelihood of harm to patient if not taken) AND [2] triager unable to fill per unit policy    Additional Information  • Negative: Drug overdose and triager unable to answer question  • Negative: Caller requesting information unrelated to medicine  • Negative: Caller requesting a prescription for Strep throat and has a positive culture result  • Negative: Rash while taking a medication or within 3 days of stopping it  • Negative: Immunization reaction suspected  • Negative: [1] Asthma and [2] having symptoms of asthma (cough, wheezing, etc.)  • Negative: [1] Influenza symptoms AND [2] anti-viral med prescription request, such as Tamiflu  • Negative: [1] Symptom of illness (e.g., headache, abdominal pain, earache, vomiting) AND [2] more than mild  • Negative: MORE THAN A DOUBLE DOSE of a prescription or over-the-counter (OTC) drug  • Negative: [1] DOUBLE DOSE (an extra dose or lesser amount) of over-the-counter (OTC) drug AND [2] any symptoms (e.g., dizziness, nausea, pain, sleepiness)  • Negative: [1] DOUBLE DOSE (an extra dose or lesser amount) of prescription drug AND [2] any symptoms (e.g., dizziness, nausea, pain, sleepiness)  • Negative: Took another person's prescription drug  • Negative: [1] DOUBLE DOSE (an extra dose or lesser amount) of prescription drug AND [2] NO symptoms (Exception: a double dose of " "antibiotics)  • Negative: Diabetes drug error or overdose (e.g., took wrong type of insulin or took extra dose)    Answer Assessment - Initial Assessment Questions  1.   NAME of MEDICATION: \"What medicine are you calling about?\"      He needs an antibiotic and steroid  2.   QUESTION: \"What is your question?\"      Asking for prescription  3.   PRESCRIBING HCP: \"Who prescribed it?\" Reason: if prescribed by specialist, call should be referred to that group.      na  4. SYMPTOMS: \"Do you have any symptoms?\"      Yes   5. SEVERITY: If symptoms are present, ask \"Are they mild, moderate or severe?\"      moderate  6.  PREGNANCY:  \"Is there any chance that you are pregnant?\" \"When was your last menstrual period?\"      no    Protocols used: MEDICATION QUESTION CALL-ADULT-      "

## 2020-08-03 NOTE — TELEPHONE ENCOUNTER
Tell Luma I be happy to reevaluate him or she may want to take him to the emergency room where he can get a CT of his chest rule out pulmonary emboli etc.

## 2020-08-03 NOTE — TELEPHONE ENCOUNTER
"Caller: Luma Gomez    Relationship to patient: Emergency Contact    Best call back number: [phone]    Characteristics of symptom/severity: HAS TO BE ON OXYGEN ALL THE TIME, SHORT OF BREATH ALL THE TIME, OVERALL NOT FEELING WELL    Where are you experiencing symptoms: ALL OVER    How long have you been experiencing symptoms: FEW WEEKS    When have you experienced or been treated for these symptoms before: NA    Have you had any recent surgeries, procedures or injections: [] Yes  [x] No   If yes, explain:      Is it the symptoms constant or intermittent: [x] Constant  [] Intermittent   What makes it worse: \"ANY KIND OF EXERTION\"      What makes it better: BREATHING TREATMENT    What therapies/medications have you tried: TRIED ANTIBIOTIC AND STEROID    If a prescription is needed, what is your preferred pharmacy:   [facility]/pharmacy #[id number] - [place] - [address] AT ACROSS FROM [address] - [phone]  - [phone]   [address]  [place] [address]  Phone: [phone] Fax: [phone]     "

## 2020-08-03 NOTE — TELEPHONE ENCOUNTER
Called Luma and she says he is getting progressively short of breath.  States she called while you were on vacation and NP's gave Ab and steroids.  He is on O2 around the clock, sometimes dips in the 80's on 2L but will come back up with breathing treatment.  She states nebulizer treatments make him better, so using q4-6 hrs.  States he is afebrile, appetite is good.  She is just not sure what to do at this point.  Do you want him to come back in to re-evaluate?

## 2020-08-05 PROBLEM — N48.1 BALANITIS: Status: ACTIVE | Noted: 2020-01-01

## 2020-08-05 NOTE — PROGRESS NOTES
Subjective   Broderick Gomez is a 89 y.o. male.   Chief Complaint   Patient presents with   • Shortness of Breath     was worse over the weekend, the cooler weather has helped        Patient is experiencing shortness of breath with air hunger.  He is also been checked by his wife and found to have a low pulse ox periodically.  This is all despite home oxygen therapy.  He has some edema both legs.  He also has noted to have darker urine since they have increased his Lasix periodically.       The following portions of the patient's history were reviewed and updated as appropriate: allergies, current medications, past family history, past medical history, past social history, past surgical history and problem list.    Review of Systems   Constitutional: Negative for activity change, appetite change, fatigue, fever, unexpected weight gain and unexpected weight loss.   HENT: Negative for swollen glands, trouble swallowing and voice change.    Eyes: Negative for blurred vision and visual disturbance.   Respiratory: Negative for cough and shortness of breath.    Cardiovascular: Negative for chest pain, palpitations and leg swelling.   Gastrointestinal: Negative for abdominal pain, constipation, diarrhea, nausea, vomiting and indigestion.   Endocrine: Negative for cold intolerance, heat intolerance, polydipsia and polyphagia.   Genitourinary: Negative for dysuria and frequency.   Musculoskeletal: Negative for arthralgias, back pain, joint swelling and neck pain.   Skin: Negative for color change, rash and skin lesions.   Neurological: Negative for dizziness, weakness, headache, memory problem and confusion.   Hematological: Does not bruise/bleed easily.   Psychiatric/Behavioral: Negative for agitation, hallucinations and suicidal ideas. The patient is not nervous/anxious.        Objective   Past Medical History:   Diagnosis Date   • Acute right lumbar radiculopathy    • Arthritis    • Ataxia    • Atrial fibrillation  (CMS/HCC)    • Bilateral edema of lower extremity    • BPPV (benign paroxysmal positional vertigo), bilateral    • Carotid occlusion, bilateral    • Cerebral microvascular disease    • CVA (cerebral vascular accident) (CMS/HCC)    • Elevated PSA    • GERD (gastroesophageal reflux disease)    • Hypercholesteremia    • Hypertension    • Inguinal hernia    • Rhinophyma    • Sinoatrial node dysfunction (CMS/HCC)    • Skin cancer    • Thrombosis of thoracic aorta (CMS/HCC)    • Total bilirubin, elevated    • TSH elevation       Past Surgical History:   Procedure Laterality Date   • CATARACT EXTRACTION, BILATERAL     • COLONOSCOPY  05/16/2012    adenomatous polyp @ cecum, hepatic flexure, 40cm, and 35cm, multiple diverticula in sigmoid and descending colon, recall 3 years, Dr. Del Castillo   • ENDOSCOPY N/A 12/13/2018    Duodenal erosions without bleeding, LA Grade D esophagitis negative for Intestinal Metaplaaia   • EYE SURGERY      eyelid replacement   • HEMORRHOIDECTOMY     • INGUINAL HERNIA REPAIR Right 2008   • KNEE SURGERY Right    • ROTATOR CUFF REPAIR Right 2000   • SKIN CANCER EXCISION          Current Outpatient Medications:   •  amLODIPine (NORVASC) 5 MG tablet, Take 5 mg by mouth Daily., Disp: , Rfl:   •  aspirin 325 MG tablet, Take 325 mg by mouth Daily., Disp: , Rfl:   •  CloNIDine (CATAPRES) 0.1 MG tablet, Take 0.1 mg by mouth 3 (Three) Times a Day As Needed for High Blood Pressure., Disp: , Rfl:   •  furosemide (LASIX) 20 MG tablet, Take 20 mg by mouth Daily., Disp: , Rfl:   •  ipratropium-albuterol (DUO-NEB) 0.5-2.5 mg/3 ml nebulizer, Take 3 mL by nebulization 4 (Four) Times a Day., Disp: 360 mL, Rfl: 2  •  levothyroxine (SYNTHROID, LEVOTHROID) 75 MCG tablet, TAKE 1 TABLET BY MOUTH EVERY DAY, Disp: 90 tablet, Rfl: 3  •  meclizine (ANTIVERT) 25 MG tablet, Take 25 mg by mouth 2 (Two) Times a Day As Needed for dizziness., Disp: , Rfl:   •  metoprolol succinate XL (TOPROL-XL) 50 MG 24 hr tablet, Take 50 mg by  mouth 2 (Two) Times a Day., Disp: , Rfl:   •  pantoprazole (PROTONIX) 40 MG EC tablet, Take 1 tablet by mouth 2 (Two) Times a Day., Disp: 180 tablet, Rfl: 3  •  sildenafil (VIAGRA) 100 MG tablet, Take 50 mg by mouth Daily As Needed for erectile dysfunction., Disp: , Rfl:   •  tamsulosin (FLOMAX) 0.4 MG capsule 24 hr capsule, TAKE 1 CAPSULE BY MOUTH EVERYDAY AT BEDTIME, Disp: 90 capsule, Rfl: 1  •  fluconazole (Diflucan) 100 MG tablet, Take 1 tablet by mouth Daily., Disp: 3 tablet, Rfl: 0  •  fluticasone-salmeterol (Advair Diskus) 250-50 MCG/DOSE DISKUS, Inhale 1 puff 2 (Two) Times a Day., Disp: 60 each, Rfl: 3  •  predniSONE (DELTASONE) 5 MG tablet, Take 1 tablet by mouth Daily., Disp: 90 tablet, Rfl: 3     Vitals:    08/05/20 1311   BP: 116/80   Pulse: 67   Temp: 97.7 °F (36.5 °C)   SpO2: 94%         08/05/20  1311   Weight: 81.6 kg (180 lb)     Patient's Body mass index is 24.41 kg/m². BMI is .      Physical Exam   Constitutional: He is oriented to person, place, and time. He appears well-developed and well-nourished.   HENT:   Head: Normocephalic and atraumatic.   Right Ear: External ear normal.   Left Ear: External ear normal.   Mouth/Throat: Oropharynx is clear and moist.   Eyes: Pupils are equal, round, and reactive to light. Conjunctivae and EOM are normal.   Neck: Normal range of motion. Neck supple. No thyromegaly present.   Cardiovascular: Normal rate, regular rhythm, normal heart sounds and intact distal pulses.   Pulmonary/Chest: Effort normal and breath sounds normal.   Dull to percussion both bases right greater than left no rales or rhonchi   Abdominal: Soft. Bowel sounds are normal.   Musculoskeletal: He exhibits edema ( 1+ edema left leg trace on the right).   Lymphadenopathy:     He has no cervical adenopathy.   Neurological: He is alert and oriented to person, place, and time.   Skin: Skin is warm and dry.   Psychiatric: He has a normal mood and affect. His behavior is normal. Thought content  normal.   Nursing note and vitals reviewed.            Assessment/Plan   Diagnoses and all orders for this visit:    1. Hypoxia (Primary)  -     Basic Metabolic Panel  -     BNP ([facility] Only)  -     XR Chest 2 View; Future    2. Chronic diastolic heart failure (CMS/HCC)    3. HTN (hypertension), benign    4. COPD with hypoxia (CMS/HCC)    5. Balanitis    Other orders  -     predniSONE (DELTASONE) 5 MG tablet; Take 1 tablet by mouth Daily.  Dispense: 90 tablet; Refill: 3  -     fluticasone-salmeterol (Advair Diskus) 250-50 MCG/DOSE DISKUS; Inhale 1 puff 2 (Two) Times a Day.  Dispense: 60 each; Refill: 3  -     fluconazole (Diflucan) 100 MG tablet; Take 1 tablet by mouth Daily.  Dispense: 3 tablet; Refill: 0        I am unsure if this is congestive heart failure secondary to cor pulmonale or whether he has some periodic bronchospasms associated with his COPD.  At any rate 1 going to go ahead and add daily prednisone dosage have told his wife that we can increase that to twice a day periodically and then decrease back to once daily I am adding a Advair maintenance inhaler.  He also has balanitis so I am adding Diflucan for that temporarily.  Chest x-ray BNP and BMP have all been ordered

## 2020-08-06 NOTE — TELEPHONE ENCOUNTER
furosemide (LASIX) 20 MG tablet was recently increased in dosage and patient is out of refills. Please send updated prescription to [facility]/pharmacy #[id number] - [place], [place] - [address] AT [address]OSS FROM [address] - [phone]  - [phone]   [phone]

## 2020-08-06 NOTE — TELEPHONE ENCOUNTER
Per Dr Rubio's note on chest xray. Patient to take Lasix bid until edema and breathing improved. Then once daily.

## 2020-08-20 NOTE — PROGRESS NOTES
Subjective   Broderick Gomez is a 89 y.o. male.   Chief Complaint   Patient presents with   • TROUBLE BREATHING     2 WEEK FOLLOW UP    • OTHER     GIVING PT POTASSIUM 10MG FROM OLD SCRIPT       Patient is here for follow-up from recent visit due to congestive heart failure.  We had done a chest x-ray showing pleural effusions his BNP was elevated we put him on diuretics daily steroid dosage as well as a maintenance inhaler.  Wife is present with him today his pulse ox is are good he is wearing oxygen on an as-needed basis she knows she needs to maintain his pulse ox 90% or better.  He seems to have dramatically improved with twice daily diuretics in addition to the steroids       The following portions of the patient's history were reviewed and updated as appropriate: allergies, current medications, past family history, past medical history, past social history, past surgical history and problem list.    Review of Systems   Constitutional: Negative for activity change, appetite change, fatigue, fever, unexpected weight gain and unexpected weight loss.   HENT: Negative for swollen glands, trouble swallowing and voice change.    Eyes: Negative for blurred vision and visual disturbance.   Respiratory: Negative for cough and shortness of breath.    Cardiovascular: Negative for chest pain, palpitations and leg swelling.   Gastrointestinal: Negative for abdominal pain, constipation, diarrhea, nausea, vomiting and indigestion.   Endocrine: Negative for cold intolerance, heat intolerance, polydipsia and polyphagia.   Genitourinary: Negative for dysuria and frequency.   Musculoskeletal: Negative for arthralgias, back pain, joint swelling and neck pain.   Skin: Negative for color change, rash and skin lesions.   Neurological: Negative for dizziness, weakness, headache, memory problem and confusion.   Hematological: Does not bruise/bleed easily.   Psychiatric/Behavioral: Negative for agitation, hallucinations and  suicidal ideas. The patient is not nervous/anxious.        Objective   Past Medical History:   Diagnosis Date   • Acute right lumbar radiculopathy    • Arthritis    • Ataxia    • Atrial fibrillation (CMS/HCC)    • Bilateral edema of lower extremity    • BPPV (benign paroxysmal positional vertigo), bilateral    • Carotid occlusion, bilateral    • Cerebral microvascular disease    • CVA (cerebral vascular accident) (CMS/HCC)    • Elevated PSA    • GERD (gastroesophageal reflux disease)    • Hypercholesteremia    • Hypertension    • Inguinal hernia    • Rhinophyma    • Sinoatrial node dysfunction (CMS/HCC)    • Skin cancer    • Thrombosis of thoracic aorta (CMS/HCC)    • Total bilirubin, elevated    • TSH elevation       Past Surgical History:   Procedure Laterality Date   • CATARACT EXTRACTION, BILATERAL     • COLONOSCOPY  05/16/2012    adenomatous polyp @ cecum, hepatic flexure, 40cm, and 35cm, multiple diverticula in sigmoid and descending colon, recall 3 years, Dr. Del Castillo   • ENDOSCOPY N/A 12/13/2018    Duodenal erosions without bleeding, LA Grade D esophagitis negative for Intestinal Metaplaaia   • EYE SURGERY      eyelid replacement   • HEMORRHOIDECTOMY     • INGUINAL HERNIA REPAIR Right 2008   • KNEE SURGERY Right    • ROTATOR CUFF REPAIR Right 2000   • SKIN CANCER EXCISION          Current Outpatient Medications:   •  amLODIPine (NORVASC) 5 MG tablet, Take 5 mg by mouth Daily., Disp: , Rfl:   •  aspirin 325 MG tablet, Take 325 mg by mouth Daily., Disp: , Rfl:   •  CloNIDine (CATAPRES) 0.1 MG tablet, Take 0.1 mg by mouth 3 (Three) Times a Day As Needed for High Blood Pressure., Disp: , Rfl:   •  fluconazole (Diflucan) 100 MG tablet, Take 1 tablet by mouth Daily., Disp: 3 tablet, Rfl: 0  •  fluticasone-salmeterol (Advair Diskus) 250-50 MCG/DOSE DISKUS, Inhale 1 puff 2 (Two) Times a Day., Disp: 60 each, Rfl: 3  •  furosemide (LASIX) 20 MG tablet, Take 1 tablet by mouth 2 (Two) Times a Day., Disp: 60 tablet, Rfl:  5  •  ipratropium-albuterol (DUO-NEB) 0.5-2.5 mg/3 ml nebulizer, Take 3 mL by nebulization 4 (Four) Times a Day., Disp: 360 mL, Rfl: 2  •  levothyroxine (SYNTHROID, LEVOTHROID) 75 MCG tablet, TAKE 1 TABLET BY MOUTH EVERY DAY, Disp: 90 tablet, Rfl: 3  •  meclizine (ANTIVERT) 25 MG tablet, Take 25 mg by mouth 2 (Two) Times a Day As Needed for dizziness., Disp: , Rfl:   •  metoprolol succinate XL (TOPROL-XL) 50 MG 24 hr tablet, Take 50 mg by mouth 2 (Two) Times a Day., Disp: , Rfl:   •  pantoprazole (PROTONIX) 40 MG EC tablet, Take 1 tablet by mouth 2 (Two) Times a Day., Disp: 180 tablet, Rfl: 3  •  predniSONE (DELTASONE) 5 MG tablet, Take 1 tablet by mouth Daily., Disp: 90 tablet, Rfl: 3  •  sildenafil (VIAGRA) 100 MG tablet, Take 50 mg by mouth Daily As Needed for erectile dysfunction., Disp: , Rfl:   •  tamsulosin (FLOMAX) 0.4 MG capsule 24 hr capsule, TAKE 1 CAPSULE BY MOUTH EVERYDAY AT BEDTIME, Disp: 90 capsule, Rfl: 1  •  potassium chloride (Micro-K) 10 MEQ CR capsule, Take 1 capsule by mouth Daily., Disp: 90 capsule, Rfl: 3     Vitals:    08/20/20 1122   BP: 110/68   Pulse: 78   Temp: 99.3 °F (37.4 °C)   SpO2: 93%         08/20/20  1122   Weight: 74.8 kg (165 lb)     Patient's Body mass index is 22.38 kg/m². BMI is .      Physical Exam   Constitutional: He is oriented to person, place, and time. He appears well-developed and well-nourished.   HENT:   Head: Normocephalic and atraumatic.   Right Ear: External ear normal.   Left Ear: External ear normal.   Nose: Nose normal.   Mouth/Throat: Oropharynx is clear and moist.   Eyes: Pupils are equal, round, and reactive to light. Conjunctivae and EOM are normal.   Neck: Normal range of motion. Neck supple. No thyromegaly present.   Cardiovascular: Normal rate, regular rhythm, normal heart sounds and intact distal pulses.   Pulmonary/Chest: Effort normal and breath sounds normal.   Abdominal: Soft. Bowel sounds are normal.   Lymphadenopathy:     He has no cervical  adenopathy.   Neurological: He is alert and oriented to person, place, and time.   Skin: Skin is warm and dry.   Psychiatric: He has a normal mood and affect. His behavior is normal. Thought content normal.   Nursing note and vitals reviewed.            Assessment/Plan   Diagnoses and all orders for this visit:    1. Hypoxia (Primary)  -     Basic Metabolic Panel; Future  -     Basic Metabolic Panel    2. Chronic diastolic heart failure (CMS/HCC)    Other orders  -     potassium chloride (Micro-K) 10 MEQ CR capsule; Take 1 capsule by mouth Daily.  Dispense: 90 capsule; Refill: 3      At this point patient is markedly improved with diuresis most of his hypoxemia seems to be from congestive heart failure due to his advanced age no further work-up or evaluation I have given his wife the permission to take the diuretic once or twice a day he is asking for refill on potassium which he is taking daily

## 2020-10-22 NOTE — TELEPHONE ENCOUNTER
"Med not at pharmacy, in [facility]. Called Dr Rubio and he said he would send again but to verify it at pharmacy.(med stated pending in epic)     Reason for Disposition  • [1] Prescription prescribed recently is not at pharmacy AND [2] triager has access to patient's EMR AND [3] prescription is recorded in the EMR    Additional Information  • Negative: Drug overdose and triager unable to answer question  • Negative: Caller requesting information unrelated to medicine  • Negative: Caller requesting a prescription for Strep throat and has a positive culture result  • Negative: Rash while taking a medication or within 3 days of stopping it  • Negative: Immunization reaction suspected  • Negative: [1] Asthma and [2] having symptoms of asthma (cough, wheezing, etc.)  • Negative: [1] Influenza symptoms AND [2] anti-viral med prescription request, such as Tamiflu  • Negative: [1] Symptom of illness (e.g., headache, abdominal pain, earache, vomiting) AND [2] more than mild  • Negative: MORE THAN A DOUBLE DOSE of a prescription or over-the-counter (OTC) drug  • Negative: [1] DOUBLE DOSE (an extra dose or lesser amount) of over-the-counter (OTC) drug AND [2] any symptoms (e.g., dizziness, nausea, pain, sleepiness)  • Negative: [1] DOUBLE DOSE (an extra dose or lesser amount) of prescription drug AND [2] any symptoms (e.g., dizziness, nausea, pain, sleepiness)  • Negative: Took another person's prescription drug  • Negative: [1] DOUBLE DOSE (an extra dose or lesser amount) of prescription drug AND [2] NO symptoms (Exception: a double dose of antibiotics)  • Negative: Diabetes drug error or overdose (e.g., took wrong type of insulin or took extra dose)  • Negative: [1] Request for URGENT new prescription or refill of \"essential\" medication (i.e., likelihood of harm to patient if not taken) AND [2] triager unable to fill per unit policy  • Negative: [1] Prescription not at pharmacy AND [2] was prescribed by PCP recently  • Negative: " "[1] Pharmacy calling with prescription questions AND [2] triager unable to answer question  • Negative: [1] Caller has URGENT medication question about med that PCP or specialist prescribed AND [2] triager unable to answer question  • Negative: [1] Caller has NON-URGENT medication question about med that PCP prescribed AND [2] triager unable to answer question  • Negative: [1] Caller requesting a NON-URGENT new prescription or refill AND [2] triager unable to refill per unit policy  • Negative: [1] Caller has medication question about med not prescribed by PCP AND [2] triager unable to answer question (e.g., compatibility with other med, storage)  • Negative: Caller requesting a CONTROLLED substance prescription refill (e.g., narcotics, ADHD medicines)  • Negative: Caller wants to use a complementary or alternative medicine    Answer Assessment - Initial Assessment Questions  1.   NAME of MEDICATION: \"What medicine are you calling about?\"       zpack  2.   QUESTION: \"What is your question?\"       Was supposed to be called in today, not at Pharmacy  3.   PRESCRIBING HCP: \"Who prescribed it?\" Reason: if prescribed by specialist, call should be referred to that group.      Dr. Rubio  4. SYMPTOMS: \"Do you have any symptoms?\"      Productive cough  5. SEVERITY: If symptoms are present, ask \"Are they mild, moderate or severe?\"      n/a  6.  PREGNANCY:  \"Is there any chance that you are pregnant?\" \"When was your last menstrual period?\"      no    Protocols used: MEDICATION QUESTION CALL-ADULT-      "

## 2021-01-01 ENCOUNTER — TELEPHONE (OUTPATIENT)
Dept: INTERNAL MEDICINE | Facility: CLINIC | Age: 86
End: 2021-01-01

## 2021-01-01 RX ORDER — CLONIDINE HYDROCHLORIDE 0.1 MG/1
TABLET ORAL
Qty: 270 TABLET | Refills: 3 | Status: SHIPPED | OUTPATIENT
Start: 2021-01-01

## 2021-01-01 RX ORDER — AMLODIPINE BESYLATE 5 MG/1
TABLET ORAL
Qty: 90 TABLET | Refills: 3 | Status: SHIPPED | OUTPATIENT
Start: 2021-01-01

## 2021-01-01 RX ORDER — TAMSULOSIN HYDROCHLORIDE 0.4 MG/1
CAPSULE ORAL
Qty: 90 CAPSULE | Refills: 3 | Status: SHIPPED | OUTPATIENT
Start: 2021-01-01

## 2021-01-01 RX ORDER — FUROSEMIDE 20 MG/1
TABLET ORAL
Qty: 180 TABLET | Refills: 1 | Status: SHIPPED | OUTPATIENT
Start: 2021-01-01

## 2021-01-01 RX ORDER — METOPROLOL SUCCINATE 50 MG/1
TABLET, EXTENDED RELEASE ORAL
Qty: 180 TABLET | Refills: 3 | Status: SHIPPED | OUTPATIENT
Start: 2021-01-01

## 2021-09-09 NOTE — TELEPHONE ENCOUNTER
Appt made with 1:15 tomorrow, per Luma's request.  She says he seems better today and doesn't want to take him to the ER unless she has to.   never used

## (undated) DEVICE — TBG SMPL FLTR LINE NASL 02/C02 A/ BX/100

## (undated) DEVICE — SENSR O2 OXIMAX FNGR A/ 18IN NONSTR

## (undated) DEVICE — FRCP BIOP COLD ENDOJAW ALLGTR W/NDL 2.8X2300MM BLU

## (undated) DEVICE — ENDOGATOR AUXILIARY WATER JET CONNECTOR: Brand: ENDOGATOR

## (undated) DEVICE — CONMED SCOPE SAVER BITE BLOCK, 20X27 MM: Brand: SCOPE SAVER

## (undated) DEVICE — YANKAUER,BULB TIP WITH VENT: Brand: ARGYLE

## (undated) DEVICE — THE CHANNEL CLEANING BRUSH IS A NYLON FLEXI BRUSH ATTACHED TO A FLEXIBLE PLASTIC SHEATH DESIGNED TO SAFELY REMOVE DEBRIS FROM FLEXIBLE ENDOSCOPES.

## (undated) DEVICE — Device: Brand: DEFENDO AIR/WATER/SUCTION AND BIOPSY VALVE

## (undated) DEVICE — CUFF,BP,DISP,1 TUBE,ADULT,HP: Brand: MEDLINE